# Patient Record
Sex: FEMALE | Race: WHITE | NOT HISPANIC OR LATINO | URBAN - METROPOLITAN AREA
[De-identification: names, ages, dates, MRNs, and addresses within clinical notes are randomized per-mention and may not be internally consistent; named-entity substitution may affect disease eponyms.]

---

## 2017-07-10 LAB — HPV MRNA E6/E7: NOT DETECTED

## 2017-12-18 ENCOUNTER — OFFICE VISIT (OUTPATIENT)
Dept: URGENT CARE | Facility: CLINIC | Age: 52
End: 2017-12-18
Payer: COMMERCIAL

## 2017-12-18 VITALS
SYSTOLIC BLOOD PRESSURE: 112 MMHG | BODY MASS INDEX: 27.29 KG/M2 | DIASTOLIC BLOOD PRESSURE: 68 MMHG | HEIGHT: 63 IN | TEMPERATURE: 99 F | OXYGEN SATURATION: 98 % | RESPIRATION RATE: 18 BRPM | WEIGHT: 154 LBS | HEART RATE: 76 BPM

## 2017-12-18 DIAGNOSIS — R42 VERTIGO: ICD-10-CM

## 2017-12-18 DIAGNOSIS — J32.9 SINUSITIS, UNSPECIFIED CHRONICITY, UNSPECIFIED LOCATION: Primary | ICD-10-CM

## 2017-12-18 PROCEDURE — 96372 THER/PROPH/DIAG INJ SC/IM: CPT | Mod: S$GLB,,, | Performed by: FAMILY MEDICINE

## 2017-12-18 PROCEDURE — 99203 OFFICE O/P NEW LOW 30 MIN: CPT | Mod: 25,S$GLB,, | Performed by: FAMILY MEDICINE

## 2017-12-18 RX ORDER — AZITHROMYCIN 250 MG/1
TABLET, FILM COATED ORAL
Qty: 6 TABLET | Refills: 0 | Status: SHIPPED | OUTPATIENT
Start: 2017-12-18 | End: 2018-11-18

## 2017-12-18 RX ORDER — FLUTICASONE PROPIONATE 50 MCG
2 SPRAY, SUSPENSION (ML) NASAL DAILY
Qty: 1 BOTTLE | Refills: 0 | Status: SHIPPED | OUTPATIENT
Start: 2017-12-18 | End: 2018-11-18

## 2017-12-18 RX ORDER — MECLIZINE HYDROCHLORIDE 25 MG/1
25 TABLET ORAL 3 TIMES DAILY PRN
Qty: 20 TABLET | Refills: 0 | Status: SHIPPED | OUTPATIENT
Start: 2017-12-18 | End: 2018-11-18

## 2017-12-18 RX ORDER — BETAMETHASONE SODIUM PHOSPHATE AND BETAMETHASONE ACETATE 3; 3 MG/ML; MG/ML
6 INJECTION, SUSPENSION INTRA-ARTICULAR; INTRALESIONAL; INTRAMUSCULAR; SOFT TISSUE
Status: COMPLETED | OUTPATIENT
Start: 2017-12-18 | End: 2017-12-18

## 2017-12-18 RX ADMIN — BETAMETHASONE SODIUM PHOSPHATE AND BETAMETHASONE ACETATE 6 MG: 3; 3 INJECTION, SUSPENSION INTRA-ARTICULAR; INTRALESIONAL; INTRAMUSCULAR; SOFT TISSUE at 11:12

## 2017-12-18 NOTE — PROGRESS NOTES
"Subjective:       Patient ID: Chelo Lindquist is a 52 y.o. female.    Vitals:  height is 5' 3" (1.6 m) and weight is 69.9 kg (154 lb). Her temperature is 98.6 °F (37 °C). Her blood pressure is 112/68 and her pulse is 76. Her respiration is 18 and oxygen saturation is 98%.     Chief Complaint: Sinus Problem    Sinus Problem   This is a new problem. The current episode started in the past 7 days. The problem is unchanged. There has been no fever. Her pain is at a severity of 2/10. Associated symptoms include congestion, ear pain and a sore throat. Pertinent negatives include no chills, coughing, headaches, hoarse voice or shortness of breath. Past treatments include nothing.     Review of Systems   Constitution: Positive for malaise/fatigue. Negative for chills and fever.   HENT: Positive for congestion, ear pain and sore throat. Negative for hoarse voice.    Eyes: Negative for discharge and redness.   Cardiovascular: Negative for chest pain, dyspnea on exertion and leg swelling.   Respiratory: Negative for cough, shortness of breath, sputum production and wheezing.    Musculoskeletal: Negative for myalgias.   Gastrointestinal: Negative for abdominal pain and nausea.   Neurological: Negative for headaches.       Objective:      Physical Exam   Constitutional: She is oriented to person, place, and time. She appears well-developed and well-nourished.   HENT:   Head: Normocephalic and atraumatic. Head is without abrasion, without contusion and without laceration.   Right Ear: External ear normal.   Left Ear: External ear normal.   Nose: Right sinus exhibits maxillary sinus tenderness and frontal sinus tenderness. Left sinus exhibits maxillary sinus tenderness and frontal sinus tenderness.   Mouth/Throat: Posterior oropharyngeal edema present. No oropharyngeal exudate or posterior oropharyngeal erythema.   Eyes: Conjunctivae and lids are normal. Pupils are equal, round, and reactive to light. Right eye exhibits no " nystagmus. Left eye exhibits no nystagmus.   Neck: Trachea normal, full passive range of motion without pain and phonation normal. Neck supple.   Cardiovascular: Normal rate, regular rhythm and normal heart sounds.    Pulmonary/Chest: Effort normal and breath sounds normal. No stridor. No respiratory distress.   Musculoskeletal: Normal range of motion.   Lymphadenopathy:        Head (right side): No submental and no submandibular adenopathy present.        Head (left side): No submental and no submandibular adenopathy present.     She has cervical adenopathy.   Neurological: She is alert and oriented to person, place, and time. She has normal reflexes. She is not disoriented. No cranial nerve deficit. She displays a negative Romberg sign. Coordination normal.   Good nose to finger and fine hand coordination.    Skin: Skin is warm, dry and intact. Capillary refill takes less than 2 seconds. No abrasion, no bruising, no burn, no ecchymosis, no laceration, no lesion and no rash noted. No erythema.   Psychiatric: She has a normal mood and affect. Her speech is normal and behavior is normal. Judgment and thought content normal. Cognition and memory are normal.   Nursing note and vitals reviewed.      Assessment:       1. Sinusitis, unspecified chronicity, unspecified location    2. Vertigo        Plan:         Sinusitis, unspecified chronicity, unspecified location  -     betamethasone acetate-betamethasone sodium phosphate injection 6 mg; Inject 1 mL (6 mg total) into the muscle one time.  -     azithromycin (ZITHROMAX Z-JONATHAN) 250 MG tablet; Take 2 tablets (500 mg) on  Day 1,  followed by 1 tablet (250 mg) once daily on Days 2 through 5.  Dispense: 6 tablet; Refill: 0  -     fluticasone (FLONASE) 50 mcg/actuation nasal spray; 2 sprays by Each Nare route once daily.  Dispense: 1 Bottle; Refill: 0    Vertigo  -     meclizine (ANTIVERT) 25 mg tablet; Take 1 tablet (25 mg total) by mouth 3 (three) times daily as needed for  Dizziness.  Dispense: 20 tablet; Refill: 0      Follow Up Comments   Make sure that you follow up with your primary care doctor in the next 2-5 days if needed .  Return to the Urgent Care if signs or symptoms change and certainly if you have worsening symptoms go to the nearest emergency department for further evaluation.

## 2017-12-18 NOTE — PATIENT INSTRUCTIONS
Inner Ear Problems: Causes of Dizziness (Vertigo)       Benign positional vertigo (BPV)  This is the most common cause of vertigo. BPV is also called benign positional paroxysmal vertigo (BPPV). It happens when crystals in the ear canals shift into the wrong place. Vertigo usually occurs when you move your head in a certain way. This can happen when turning in bed, bending, or looking up. Because BPV comes on quickly, you should think about if you are safe to drive or do other tasks that need your full attention.  BPV:  · Causes vertigo that last for seconds. Vertigo can occur several times a day, depending on body position.  · Doesnt cause hearing loss  · Often goes away on its own. But it but may go away sooner with treatment.  Infection or inflammation  Sometimes the semicircular canals swell and send incorrect balance signals. This problem may be caused by a viral infection. Depending on the cause, your hearing can be affected (labyrinthitis). Or your hearing can remain normal (neuronitis).  Infection or inflammation:  · Causes vertigo that lasts for hours or days. The first episode is usually the worst.  · Can cause hearing loss  · Often goes away on its own. But it may go away sooner with treatment.  You may need vestibular rehabilitation if you have balance problems that don't go away.  Menieres disease  This condition is uncommon. It happens when there is too much fluid in the ear canals. This causes increased pressure and swelling. It affects balance and hearing signals.  Menieres disease may:  · Cause vertigo that last for hours  · Cause hearing problems that come and go. The problems are usually in one ear and get worse over time.  · Cause buzzing or ringing in the ears (tinnitus)  · Cause a feeling of fullness or pressure in the ear  · Cause any of these symptoms: vertigo, hearing loss, tinnitus, or ear fullness to last a lifetime  Date Last Reviewed: 11/1/2016  © 1114-8466 [employer],  [employer]. [address] [place] [id number]. All rights reserved. This information is not intended as a substitute for professional medical care. Always follow your healthcare professional's instructions.        Sinusitis (Antibiotic Treatment)    The sinuses are air-filled spaces within the bones of the face. They connect to the inside of the nose. Sinusitis is an inflammation of the tissue lining the sinus cavity. Sinus inflammation can occur during a cold. It can also be due to allergies to pollens and other particles in the air. Sinusitis can cause symptoms of sinus congestion and fullness. A sinus infection causes fever, headache and facial pain. There is often green or yellow drainage from the nose or into the back of the throat (post-nasal drip). You have been given antibiotics to treat this condition.  Home care:  · Take the full course of antibiotics as instructed. Do not stop taking them, even if you feel better.  · Drink plenty of water, hot tea, and other liquids. This may help thin mucus. It also may promote sinus drainage.  · Heat may help soothe painful areas of the face. Use a towel soaked in hot water. Or,  the shower and direct the hot spray onto your face. Using a vaporizer along with a menthol rub at night may also help.   · An expectorant containing guaifenesin may help thin the mucus and promote drainage from the sinuses.  · Over-the-counter decongestants may be used unless a similar medicine was prescribed. Nasal sprays work the fastest. Use one that contains phenylephrine or oxymetazoline. First blow the nose gently. Then use the spray. Do not use these medicines more often than directed on the label or symptoms may get worse. You may also use tablets containing pseudoephedrine. Avoid products that combine ingredients, because side effects may be increased. Read labels. You can also ask the pharmacist for help. (NOTE: Persons with high blood pressure should not use decongestants.  They can raise blood pressure.)  · Over-the-counter antihistamines may help if allergies contributed to your sinusitis.    · Do not use nasal rinses or irrigation during an acute sinus infection, unless told to by your health care provider. Rinsing may spread the infection to other sinuses.  · Use acetaminophen or ibuprofen to control pain, unless another pain medicine was prescribed. (If you have chronic liver or kidney disease or ever had a stomach ulcer, talk with your doctor before using these medicines. Aspirin should never be used in anyone under 18 years of age who is ill with a fever. It may cause severe liver damage.)  · Don't smoke. This can worsen symptoms.  Follow-up care  Follow up with your healthcare provider or our staff if you are not improving within the next week.  When to seek medical advice  Call your healthcare provider if any of these occur:  · Facial pain or headache becoming more severe  · Stiff neck  · Unusual drowsiness or confusion  · Swelling of the forehead or eyelids  · Vision problems, including blurred or double vision  · Fever of 100.4ºF (38ºC) or higher, or as directed by your healthcare provider  · Seizure  · Breathing problems  · Symptoms not resolving within 10 days  Date Last Reviewed: 4/13/2015  © 1285-7255 [employer]. [address] [place] [address], [place] [address]. All rights reserved. This information is not intended as a substitute for professional medical care. Always follow your healthcare professional's instructions.                                                                    Sinusitis   If your condition worsens or fails to improve we recommend that you receive another evaluation at the ER immediately or contact your PCP to discuss your concerns or return here. You must understand that you've received an urgent care treatment only and that you may be released before all your medical problems are known or treated. You the patient will arrange for  "followup care as instructed.   If we discussed that I think your illness is viral it will not respond to antibiotics and it will last 10-14 days. However, if over the next few days the symptoms worsen start the antibiotics I have given you.   If we discussed that you require antibiotics start them now and take them to completion.   If you are female and on BCP and do take the antibiotics, use additional methods to prevent pregnancy while on the antibiotics and for one cycle after.   Flonase (fluticasone) is a nasal spray which is available over the counter and may help with your symptoms   Zyrtec D, Claritin D or allegra D can also help with symptoms of congestion and drainage.   If you have hypertension avoid using the "D" which is the decongestant   If you just have drainage you can take plain zyrtec, claritin or allegra   If you just have a congested feeling you can take pseudoephedrine (unless you have high blood pressure) which you have to sign for behind the counter. Do not buy the phenylephrine which is on the shelf as it is not effective   Rest and fluids are also important.   Tylenol or ibuprofen can also be used as directed for pain unless you have an allergy to them or medical condition such as stomach ulcers, kidney or liver disease or blood thinners etc for which you should not be taking these type of medications.   If you are flying in the next few days Afrin nose drops for the airplane flight upon take off and landing may help. Other than at those times refrain from using afrin.   If you were prescribed a narcotic do not drive or operate heavy machinery while taking these medications.       "

## 2018-04-10 ENCOUNTER — OFFICE VISIT (OUTPATIENT)
Dept: URGENT CARE | Facility: CLINIC | Age: 53
End: 2018-04-10
Payer: COMMERCIAL

## 2018-04-10 VITALS
TEMPERATURE: 99 F | WEIGHT: 154 LBS | HEIGHT: 63 IN | OXYGEN SATURATION: 97 % | RESPIRATION RATE: 18 BRPM | BODY MASS INDEX: 27.29 KG/M2 | SYSTOLIC BLOOD PRESSURE: 107 MMHG | HEART RATE: 94 BPM | DIASTOLIC BLOOD PRESSURE: 70 MMHG

## 2018-04-10 DIAGNOSIS — J32.9 SINUSITIS, UNSPECIFIED CHRONICITY, UNSPECIFIED LOCATION: Primary | ICD-10-CM

## 2018-04-10 PROCEDURE — 99214 OFFICE O/P EST MOD 30 MIN: CPT | Mod: S$GLB,,, | Performed by: PHYSICIAN ASSISTANT

## 2018-04-10 PROCEDURE — 96372 THER/PROPH/DIAG INJ SC/IM: CPT | Mod: S$GLB,,, | Performed by: FAMILY MEDICINE

## 2018-04-10 RX ORDER — AMOXICILLIN AND CLAVULANATE POTASSIUM 875; 125 MG/1; MG/1
1 TABLET, FILM COATED ORAL 2 TIMES DAILY
Qty: 14 TABLET | Refills: 0 | Status: SHIPPED | OUTPATIENT
Start: 2018-04-10 | End: 2018-04-17

## 2018-04-10 RX ORDER — BETAMETHASONE SODIUM PHOSPHATE AND BETAMETHASONE ACETATE 3; 3 MG/ML; MG/ML
6 INJECTION, SUSPENSION INTRA-ARTICULAR; INTRALESIONAL; INTRAMUSCULAR; SOFT TISSUE
Status: COMPLETED | OUTPATIENT
Start: 2018-04-10 | End: 2018-04-10

## 2018-04-10 RX ADMIN — BETAMETHASONE SODIUM PHOSPHATE AND BETAMETHASONE ACETATE 6 MG: 3; 3 INJECTION, SUSPENSION INTRA-ARTICULAR; INTRALESIONAL; INTRAMUSCULAR; SOFT TISSUE at 09:04

## 2018-04-10 NOTE — PATIENT INSTRUCTIONS
Please take antibiotic to completion.  -Below are suggestions for symptomatic relief:   -Tylenol every 4 hours OR ibuprofen every 6 hours as needed for pain/fever.   -Salt water gargles to soothe throat pain.   -Chloroseptic spray also helps to numb throat pain.   -Nasal saline spray reduces inflammation and dryness.   -Warm face compresses to help with facial sinus pain/pressure.   -Vicks vapor rub at night.   -Flonase OTC or Nasacort OTC for nasal congestion.   -Simple foods like chicken noodle soup.   -Delsym helps with coughing at night   -Zyrtec/Claritin during the day & Benadryl at night may help with allergies.     If you DO NOT have Hypertension or any history of palpitations, it is ok to take over the counter Sudafed or Mucinex D or Allegra-D or Claritin-D or Zyrtec-D.  If you do take one of the above, it is ok to combine that with plain over the counter Mucinex or Allegra or Claritin or Zyrtec. If, for example, you are taking Zyrtec -D, you can combine that with Mucinex, but not Mucinex-D.  If you are taking Mucinex-D, you can combine that with plain Allegra or Claritin or Zyrtec.   If you DO have Hypertension or palpitations, it is safe to take Coricidin HBP for relief of sinus symptoms.    Please follow up with your primary care provider within 2-5 days if your signs and symptoms have not resolved or worsen.     If your condition worsens or fails to improve we recommend that you receive another evaluation at the emergency room immediately or contact your primary medical clinic to discuss your concerns.   You must understand that you have received an Urgent Care treatment only and that you may be released before all of your medical problems are known or treated. You, the patient, will arrange for follow up care as instructed.

## 2018-04-10 NOTE — PROGRESS NOTES
"Subjective:       Patient ID: Chelo Lindquist is a 52 y.o. female.    Vitals:  height is 5' 3" (1.6 m) and weight is 69.9 kg (154 lb). Her temperature is 98.5 °F (36.9 °C). Her blood pressure is 107/70 and her pulse is 94. Her respiration is 18 and oxygen saturation is 97%.     Chief Complaint: Cough    Cough   This is a new problem. The current episode started yesterday. The problem has been gradually worsening. The problem occurs hourly. The cough is productive of sputum. Associated symptoms include nasal congestion, postnasal drip, a sore throat and shortness of breath. Pertinent negatives include no chest pain, chills, ear pain, eye redness, fever, headaches, myalgias, rash or wheezing. Treatments tried: Allergy Medicine  The treatment provided no relief.     Review of Systems   Constitution: Positive for malaise/fatigue. Negative for chills and fever.   HENT: Positive for congestion, hoarse voice, postnasal drip and sore throat. Negative for ear pain.    Eyes: Negative for discharge and redness.   Cardiovascular: Negative for chest pain, dyspnea on exertion and leg swelling.   Respiratory: Positive for cough, shortness of breath and sputum production. Negative for wheezing.    Skin: Negative for color change and rash.   Musculoskeletal: Negative for myalgias.   Gastrointestinal: Negative for abdominal pain and nausea.   Neurological: Negative for headaches.       Objective:      Physical Exam   Constitutional: She is oriented to person, place, and time. Vital signs are normal. She appears well-developed and well-nourished. She does not appear ill. No distress.   HENT:   Head: Normocephalic and atraumatic.   Right Ear: Hearing, tympanic membrane, external ear and ear canal normal.   Left Ear: Hearing, tympanic membrane, external ear and ear canal normal.   Nose: Mucosal edema present. Right sinus exhibits maxillary sinus tenderness and frontal sinus tenderness. Left sinus exhibits maxillary sinus tenderness " and frontal sinus tenderness.   Mouth/Throat: Uvula is midline. Posterior oropharyngeal erythema present. No oropharyngeal exudate or posterior oropharyngeal edema.   Hoarse voice.   Eyes: Conjunctivae, EOM and lids are normal. Right eye exhibits no discharge. Left eye exhibits no discharge.   Neck: Normal range of motion. Neck supple.   Cardiovascular: Normal rate, regular rhythm and normal heart sounds.  Exam reveals no gallop and no friction rub.    No murmur heard.  Pulmonary/Chest: Effort normal and breath sounds normal. No respiratory distress. She has no decreased breath sounds. She has no wheezes. She has no rhonchi. She has no rales.   Musculoskeletal: Normal range of motion.   Lymphadenopathy:        Head (right side): No submandibular and no tonsillar adenopathy present.        Head (left side): No submandibular and no tonsillar adenopathy present.   Neurological: She is alert and oriented to person, place, and time.   Skin: Skin is warm and dry. No rash noted. She is not diaphoretic. No erythema. No pallor.   Psychiatric: She has a normal mood and affect. Her behavior is normal.   Nursing note and vitals reviewed.      Assessment:       1. Sinusitis, unspecified chronicity, unspecified location        Plan:         Sinusitis, unspecified chronicity, unspecified location  -     betamethasone acetate-betamethasone sodium phosphate injection 6 mg; Inject 1 mL (6 mg total) into the muscle one time.  -     amoxicillin-clavulanate 875-125mg (AUGMENTIN) 875-125 mg per tablet; Take 1 tablet by mouth 2 (two) times daily.  Dispense: 14 tablet; Refill: 0      Patient Instructions   Please take antibiotic to completion.  -Below are suggestions for symptomatic relief:   -Tylenol every 4 hours OR ibuprofen every 6 hours as needed for pain/fever.   -Salt water gargles to soothe throat pain.   -Chloroseptic spray also helps to numb throat pain.   -Nasal saline spray reduces inflammation and dryness.   -Warm face  compresses to help with facial sinus pain/pressure.   -Vicks vapor rub at night.   -Flonase OTC or Nasacort OTC for nasal congestion.   -Simple foods like chicken noodle soup.   -Delsym helps with coughing at night   -Zyrtec/Claritin during the day & Benadryl at night may help with allergies.     If you DO NOT have Hypertension or any history of palpitations, it is ok to take over the counter Sudafed or Mucinex D or Allegra-D or Claritin-D or Zyrtec-D.  If you do take one of the above, it is ok to combine that with plain over the counter Mucinex or Allegra or Claritin or Zyrtec. If, for example, you are taking Zyrtec -D, you can combine that with Mucinex, but not Mucinex-D.  If you are taking Mucinex-D, you can combine that with plain Allegra or Claritin or Zyrtec.   If you DO have Hypertension or palpitations, it is safe to take Coricidin HBP for relief of sinus symptoms.    Please follow up with your primary care provider within 2-5 days if your signs and symptoms have not resolved or worsen.     If your condition worsens or fails to improve we recommend that you receive another evaluation at the emergency room immediately or contact your primary medical clinic to discuss your concerns.   You must understand that you have received an Urgent Care treatment only and that you may be released before all of your medical problems are known or treated. You, the patient, will arrange for follow up care as instructed.

## 2018-04-13 ENCOUNTER — TELEPHONE (OUTPATIENT)
Dept: URGENT CARE | Facility: CLINIC | Age: 53
End: 2018-04-13

## 2018-11-18 ENCOUNTER — OFFICE VISIT (OUTPATIENT)
Dept: URGENT CARE | Facility: CLINIC | Age: 53
End: 2018-11-18
Payer: COMMERCIAL

## 2018-11-18 VITALS
WEIGHT: 152 LBS | HEART RATE: 92 BPM | BODY MASS INDEX: 26.93 KG/M2 | RESPIRATION RATE: 16 BRPM | OXYGEN SATURATION: 99 % | TEMPERATURE: 99 F | SYSTOLIC BLOOD PRESSURE: 108 MMHG | DIASTOLIC BLOOD PRESSURE: 66 MMHG | HEIGHT: 63 IN

## 2018-11-18 DIAGNOSIS — J30.1 SEASONAL ALLERGIC RHINITIS DUE TO POLLEN: Primary | ICD-10-CM

## 2018-11-18 DIAGNOSIS — J06.9 URI, ACUTE: ICD-10-CM

## 2018-11-18 PROCEDURE — 96372 THER/PROPH/DIAG INJ SC/IM: CPT | Mod: S$GLB,,, | Performed by: FAMILY MEDICINE

## 2018-11-18 PROCEDURE — 3008F BODY MASS INDEX DOCD: CPT | Mod: CPTII,S$GLB,, | Performed by: FAMILY MEDICINE

## 2018-11-18 PROCEDURE — 99214 OFFICE O/P EST MOD 30 MIN: CPT | Mod: 25,S$GLB,, | Performed by: FAMILY MEDICINE

## 2018-11-18 RX ORDER — BENZONATATE 100 MG/1
200 CAPSULE ORAL 3 TIMES DAILY PRN
Qty: 40 CAPSULE | Refills: 0 | Status: SHIPPED | OUTPATIENT
Start: 2018-11-18 | End: 2020-11-12

## 2018-11-18 RX ORDER — BETAMETHASONE SODIUM PHOSPHATE AND BETAMETHASONE ACETATE 3; 3 MG/ML; MG/ML
6 INJECTION, SUSPENSION INTRA-ARTICULAR; INTRALESIONAL; INTRAMUSCULAR; SOFT TISSUE
Status: COMPLETED | OUTPATIENT
Start: 2018-11-18 | End: 2018-11-18

## 2018-11-18 RX ADMIN — BETAMETHASONE SODIUM PHOSPHATE AND BETAMETHASONE ACETATE 6 MG: 3; 3 INJECTION, SUSPENSION INTRA-ARTICULAR; INTRALESIONAL; INTRAMUSCULAR; SOFT TISSUE at 09:11

## 2018-11-18 NOTE — PROGRESS NOTES
"Subjective:       Patient ID: Chelo Lindquist is a 53 y.o. female.    Vitals:  height is 5' 3" (1.6 m) and weight is 68.9 kg (152 lb). Her oral temperature is 98.5 °F (36.9 °C). Her blood pressure is 108/66 and her pulse is 92. Her respiration is 16 and oxygen saturation is 99%.     Chief Complaint: Sinus Problem    C/o sore throat, post nasal drip and non productive coough x 3-4 days      Sinus Problem   This is a new problem. Episode onset: 4 days. The problem has been gradually worsening since onset. There has been no fever. Her pain is at a severity of 0/10. The pain is mild. Associated symptoms include congestion, coughing, sinus pressure, sneezing and a sore throat. Pertinent negatives include no chills, diaphoresis, ear pain or shortness of breath. Past treatments include acetaminophen. The treatment provided mild relief.       Constitution: Positive for generalized weakness. Negative for chills, sweating, fatigue and fever.   HENT: Positive for congestion, sinus pain, sinus pressure and sore throat. Negative for ear pain and voice change.    Neck: Negative for painful lymph nodes.   Eyes: Negative for eye redness.   Respiratory: Positive for cough and sputum production. Negative for chest tightness, bloody sputum, COPD, shortness of breath, stridor, wheezing and asthma.    Gastrointestinal: Negative for nausea and vomiting.   Musculoskeletal: Negative for muscle ache.   Skin: Negative for rash.   Allergic/Immunologic: Positive for sneezing. Negative for seasonal allergies and asthma.   Hematologic/Lymphatic: Negative for swollen lymph nodes.       Objective:      Physical Exam   Constitutional: She is oriented to person, place, and time. She appears well-developed and well-nourished. She is cooperative.  Non-toxic appearance. She does not appear ill. No distress.   HENT:   Head: Normocephalic and atraumatic.   Right Ear: Hearing, tympanic membrane, external ear and ear canal normal.   Left Ear: Hearing, " tympanic membrane, external ear and ear canal normal.   Nose: Nose normal. No mucosal edema, rhinorrhea or nasal deformity. No epistaxis. Right sinus exhibits no maxillary sinus tenderness and no frontal sinus tenderness. Left sinus exhibits no maxillary sinus tenderness and no frontal sinus tenderness.   Mouth/Throat: Uvula is midline, oropharynx is clear and moist and mucous membranes are normal. No trismus in the jaw. Normal dentition. No uvula swelling. No oropharyngeal exudate or posterior oropharyngeal erythema.   Eyes: Conjunctivae and lids are normal. Right eye exhibits no discharge. Left eye exhibits no discharge. No scleral icterus.   Sclera clear bilat   Neck: Trachea normal, normal range of motion, full passive range of motion without pain and phonation normal. Neck supple. No JVD present. No tracheal deviation present. No thyromegaly present.   Cardiovascular: Normal rate, regular rhythm, normal heart sounds, intact distal pulses and normal pulses. Exam reveals no friction rub.   No murmur heard.  Pulmonary/Chest: Effort normal and breath sounds normal. No stridor. No respiratory distress. She has no wheezes.   Abdominal: Soft. Normal appearance and bowel sounds are normal. She exhibits no distension, no pulsatile midline mass and no mass. There is no tenderness.   Genitourinary: Rectal exam shows guaiac negative stool.   Musculoskeletal: Normal range of motion. She exhibits no edema or deformity.   Lymphadenopathy:     She has no cervical adenopathy.   Neurological: She is alert and oriented to person, place, and time. She exhibits normal muscle tone. Coordination normal.   Skin: Skin is warm, dry and intact. She is not diaphoretic. No pallor.   Psychiatric: She has a normal mood and affect. Her speech is normal and behavior is normal. Judgment and thought content normal. Cognition and memory are normal.   Nursing note and vitals reviewed.      Assessment:       1. Seasonal allergic rhinitis due to  pollen    2. URI, acute        Plan:         Seasonal allergic rhinitis due to pollen  -     betamethasone acetate-betamethasone sodium phosphate injection 6 mg  -     benzonatate (TESSALON PERLES) 100 MG capsule; Take 2 capsules (200 mg total) by mouth 3 (three) times daily as needed for Cough.  Dispense: 40 capsule; Refill: 0    URI, acute  -     benzonatate (TESSALON PERLES) 100 MG capsule; Take 2 capsules (200 mg total) by mouth 3 (three) times daily as needed for Cough.  Dispense: 40 capsule; Refill: 0        Claritin one daily    Mucinex DM one bid

## 2020-10-29 ENCOUNTER — PATIENT OUTREACH (OUTPATIENT)
Dept: ADMINISTRATIVE | Facility: HOSPITAL | Age: 55
End: 2020-10-29

## 2020-10-29 NOTE — PROGRESS NOTES
Pre-visit chart review completed.  Unable to log in to LINKS to update immuniztions    Patient due for the following    Hepatitis C Screening     Lipid Panel     HIV Screening     TETANUS VACCINE     Shingles Vaccine (1 of 2)    Colorectal Cancer Screening     Influenza Vaccine (1)      Found pap w/ hpv results and mammogram in care everywhere.  Scanned to media.  HM updated.    E-faxed [facility] endoscopy center for most recent c-scope results.    Provider to discuss HIV and Hep C screening.    No out reach needed.

## 2020-10-29 NOTE — LETTER
AUTHORIZATION FOR RELEASE OF   CONFIDENTIAL INFORMATION    TO: Endoscopy Center,    We are seeing Chelo Lindquist, date of birth 1965, in the clinic at [facility]. Kierra Fernandez MD is the patient's PCP. Chelo Lindquist has an outstanding lab/procedure at the time we reviewed her chart. In order to help keep her health information updated, she has authorized us to request the following medical record(s):        (  )  MAMMOGRAM                                      ( X )  COLONOSCOPY      (  )  PAP SMEAR                                          (  )  OUTSIDE LAB RESULTS     (  )  DEXA SCAN                                          (  )  EYE EXAM            (  )  FOOT EXAM                                          (  )  ENTIRE RECORD     (  )  OUTSIDE IMMUNIZATIONS                 (  )  _______________         Please fax records to [facility], Nicole Giambrone, MD, [phone]     If you have any questions, please contact Teri at [phone].           Patient Name: Chelo Lindquist  : 1965  Patient Phone #: [phone]

## 2020-11-11 NOTE — PROGRESS NOTES
"[facility] Note    Chief Complaint      Chief Complaint   Patient presents with    Establish Care       History of Present Illness      Chelo Lindquits is a 55 y.o. WF with h/o Breast fibroadenoma presents to re-est care with me and to fu well visit.     Left breast Pain - Pt fu by [facility].  Recent MGM as below.  Pt prev told she had Breast Ca by prev OB/GYN - Dr. Pope and went to [facility] and had multiple biopsies and had no evidence of malignancy. She plans to fu with [facility].    Palpitations - She saw Cards, Dr. Freeman, in past.  She had a Holter and echo and these were normal.  It happens every other day.  They are not more frequent. They last 5 -10 minutes? And self resolve. "It makes me anxious".  Rec avoid oral decongestants. She had CP and Left arm pain last wk.  It lasted 15-20 minutes.  Then she has neck and shoulder soreness the rest of the day. Onset - sitting on sofa.  She did do some heavy lifting that AM and the day before.  She does zaynab work with her . She prefers not to go back to Dr. Freeman as sister-in-law  while under his care.     Dizziness - Sometimes gets a spinning sensation. Lightheadedness and sometimes imbalance.  It comes and goes. It happens every other day.  It can last hours. No assoc with change in position. Sometimes be associated with her palpitations.     HCM - Flu - 10/2020 at [facility];  Tdap - ? At [facility];  PCV 13 - none;  PVX 23 - none;  Shingrix - none - defers; 3D MGM - 21 - repeat 1 yr; h/o Breast Bx - 19;  DEXA - none;  PAP - 7/10/17 and HPV neg- due; Hep C Screen - none - will order;  HIV Screen - utd?; C-scope - none - will refer; Prev PCP - me at [facility] > 3 yrs ago; Prev Ob/GYN - Dr. Pope - would like a new OB; Optometrist - Dr. Finkelstein    Other Result Information   Interface, Radiology Results In - 2020  1:22 PM CDT  CLINICAL INDICATION:  Patient is a 55 year old female and is seen for screening.    MAMMO DIGITAL " SCREENING BILATERAL W LORAINE  Digital Mammogram evaluated with Computer Aided Detection (CAD).    COMPARISON:  The present examination has been compared to prior imaging studies performed at  an outside location on 2016, and at [facility]  on 2018 and 2019.    FINDINGS:  The breasts are heterogeneously dense, which may obscure small masses.    1:  There are benign appearing calcifications with grouped distribution and  associated post biopsy clip in the left breast upper outer quadrant at 1 to 2  o'clock located 7 centimeters from the nipple.  On 18, stereotactic biopsy  showed fibroadenomatoid changes with microcalcifications.    2:  There is an oval mass in the left breast outer hemisphere at 3 o'clock  located 6 centimeters from the nipple.  An internal clip was noted.  On 18,  ultrasound-guided core biopsy showed evidence of a fibroadenoma.    3:  There is an obscured mass in the left breast lower outer quadrant at 4  o'clock located 3 centimeters from the nipple.  An associated clip was noted.  On 18, ultrasound-guided core biopsy showed evidence of a fibroadenoma.    In the right breast, no dominant mass, distortion, or suspicious calcifications  are identified.    Tomosynthesis performed in CC and MLO projections.    IMPRESSION:  There is no mammographic evidence of malignancy.    Routine follow-up mammogram in 1 year is recommended.    BI-RADS Category 2:  Benign Finding(s)         Past Medical History:  Past Medical History:   Diagnosis Date    Vertigo        Past Surgical History:   has a past surgical history that includes Breast biopsy; ear cyst; Breast cyst excision; Laparoscopy; Ulnar nerve repair; Carpal tunnel release (Right);  section; Dilation and curettage of uterus; and Breast biopsy.    Family History:  family history includes Bone cancer in her maternal uncle; Breast cancer in her maternal cousin; Diabetes in her father;  Emphysema in her father; Hypertension in her father; Lung cancer in her maternal uncle and paternal aunt; No Known Problems in her brother and sister; Other in her son and son; Squamous cell carcinoma in her son; Testicular cancer in her paternal uncle; Thyroid disease in her mother.     Social History:  Social History     Tobacco Use    Smoking status: Never Smoker    Smokeless tobacco: Never Used   Substance Use Topics    Alcohol use: Yes     Frequency: 2-4 times a month     Drinks per session: 1 or 2     Comment: 1 drink Q 2 wks    Drug use: Never       Review of Systems   Constitutional: Negative for chills, diaphoresis and fever.   HENT: Negative for hearing loss and tinnitus.    Eyes: Positive for blurred vision. Negative for double vision.   Respiratory: Negative for cough, shortness of breath and wheezing.    Cardiovascular: Positive for chest pain and palpitations. Negative for leg swelling.   Gastrointestinal: Negative for abdominal pain, blood in stool, constipation, diarrhea, heartburn, melena, nausea and vomiting.   Genitourinary: Negative for dysuria and frequency.   Musculoskeletal: Negative for joint pain, myalgias and neck pain.        Still tightness in neck/upper back - rec heating pad for 20 min intervals. Can take Aleve prn with food and monitor for any GI effects.    Skin: Negative for itching and rash.   Neurological: Positive for dizziness. Negative for headaches.   Endo/Heme/Allergies: Bruises/bleeds easily.   Psychiatric/Behavioral: Negative for depression. The patient is nervous/anxious.         Regarding son's medical issues.         Medications:  Outpatient Encounter Medications as of 11/12/2020   Medication Sig Dispense Refill    LORYNA, 28, 3-0.02 mg per tablet Take 1 tablet by mouth once daily.      [DISCONTINUED] benzonatate (TESSALON PERLES) 100 MG capsule Take 2 capsules (200 mg total) by mouth 3 (three) times daily as needed for Cough. 40 capsule 0     No  "facility-administered encounter medications on file as of 11/12/2020.        Current Outpatient Medications:     LORYNA, 28, 3-0.02 mg per tablet, Take 1 tablet by mouth once daily., Disp: , Rfl:     Allergies:  Review of patient's allergies indicates:   Allergen Reactions    Morphine Hives       Health Maintenance:    There is no immunization history on file for this patient.   Health Maintenance   Topic Date Due    Hepatitis C Screening  1965    Lipid Panel  1965    TETANUS VACCINE  06/07/1984    Mammogram  06/25/2022    Pap Smear with HPV Cotest  07/10/2022      Objective:      Vital Signs  Temp: 98 °F (36.7 °C)  Pulse: 88  Resp: 19  SpO2: 99 %  BP: 110/64  BP Location: Right arm  Patient Position: Sitting  Pain Score: 0-No pain  Height and Weight  Height: 5' 3" (160 cm)  Weight: 63.6 kg (140 lb 3.4 oz)  BSA (Calculated - sq m): 1.68 sq meters  BMI (Calculated): 24.8  Weight in (lb) to have BMI = 25: 140.8]    Laboratory:    Physical Exam  Vitals signs reviewed.   Constitutional:       General: She is not in acute distress.     Appearance: Normal appearance. She is not ill-appearing, toxic-appearing or diaphoretic.   HENT:      Head: Normocephalic and atraumatic.      Right Ear: Tympanic membrane normal.      Left Ear: Tympanic membrane normal.   Eyes:      Extraocular Movements: Extraocular movements intact.      Conjunctiva/sclera: Conjunctivae normal.      Pupils: Pupils are equal, round, and reactive to light.   Neck:      Musculoskeletal: Normal range of motion. No neck rigidity.      Vascular: No carotid bruit.   Cardiovascular:      Rate and Rhythm: Normal rate and regular rhythm.      Pulses: Normal pulses.      Heart sounds: Normal heart sounds.   Pulmonary:      Effort: No respiratory distress.      Breath sounds: Normal breath sounds. No wheezing or rhonchi.   Abdominal:      General: Bowel sounds are normal. There is no distension.      Palpations: Abdomen is soft.      Tenderness: " There is no abdominal tenderness. There is no guarding or rebound.   Musculoskeletal: Normal range of motion.         General: No swelling or tenderness.   Lymphadenopathy:      Cervical: No cervical adenopathy.   Skin:     General: Skin is warm and dry.      Comments: Breast Exam - no palpable masses; TTP over Left breast over areola at 12 - 2 o'clock and to lateral breast at 4-6 o'clock. Slight nipple retraction bilaterally.     Neurological:      General: No focal deficit present.      Mental Status: She is alert and oriented to person, place, and time.   Psychiatric:         Mood and Affect: Mood normal.         Behavior: Behavior normal.             Assessment:       1. Normal physical exam, routine    2. Palpitations    3. Atypical chest pain    4. Colon cancer screening    5. Need for hepatitis C screening test    6. Screening for lipoid disorders    7. Encounter for Papanicolaou smear for cervical cancer screening        Chelo Lindquist is a 55 y.o.female with:    1. Normal physical exam, routine  - CBC Auto Differential; Future  - Comprehensive Metabolic Panel; Future  - T4, Free; Future  - TSH; Future  - Performed today.  Will check Basic labs.  RTC in 1 yr for fu or sooner if needed    2. Palpitations  - IN OFFICE EKG 12-LEAD (to [place])  - Ambulatory referral/consult to Cardiology; Future  - Check EKG - NSR.  Will obtain old records from [facility] and from [facility], Dr. Efrain Freeman.  Avoid oral decongestants.  Check TFT's.     3. Atypical chest pain  - IN OFFICE EKG 12-LEAD (to [place])  - Ambulatory referral/consult to Cardiology; Future  -Suspect may be MSK.  Check EKG. Rec heating pad to neck x 20 min.  Ok for Aleve with food prn.     4. Breast pain, left  -Pt will fu with [facility].  Breast exam today - no masses palpated or asymmetry. Slight nipple retraction bilaterally.     5. Colon cancer screening  - Ambulatory referral/consult to Gastroenterology; Future  -Refer to GI, Dr. Stokes    6. Need for hepatitis C  screening test  - Hepatitis C Antibody; Future    7. Screening for lipoid disorders  - Lipid Panel; Future    8. Encounter for Papanicolaou smear for cervical cancer screening  - Ambulatory referral/consult to Obstetrics / Gynecology; Future       Chronic conditions status updated as per HPI.  Other than changes above, cont current medications and maintain follow up with specialists.  Return to clinic in 12 months for well visit or sooner if needed.    Kierra Fernandez MD  [facility]

## 2020-11-12 ENCOUNTER — OFFICE VISIT (OUTPATIENT)
Dept: PRIMARY CARE CLINIC | Facility: CLINIC | Age: 55
End: 2020-11-12
Payer: COMMERCIAL

## 2020-11-12 ENCOUNTER — TELEPHONE (OUTPATIENT)
Dept: PRIMARY CARE CLINIC | Facility: CLINIC | Age: 55
End: 2020-11-12

## 2020-11-12 VITALS
HEIGHT: 63 IN | TEMPERATURE: 98 F | HEART RATE: 88 BPM | DIASTOLIC BLOOD PRESSURE: 64 MMHG | BODY MASS INDEX: 24.84 KG/M2 | WEIGHT: 140.19 LBS | RESPIRATION RATE: 19 BRPM | OXYGEN SATURATION: 99 % | SYSTOLIC BLOOD PRESSURE: 110 MMHG

## 2020-11-12 DIAGNOSIS — Z12.4 ENCOUNTER FOR PAPANICOLAOU SMEAR FOR CERVICAL CANCER SCREENING: ICD-10-CM

## 2020-11-12 DIAGNOSIS — R07.89 ATYPICAL CHEST PAIN: ICD-10-CM

## 2020-11-12 DIAGNOSIS — N64.4 BREAST PAIN, LEFT: ICD-10-CM

## 2020-11-12 DIAGNOSIS — Z12.11 COLON CANCER SCREENING: ICD-10-CM

## 2020-11-12 DIAGNOSIS — Z13.220 SCREENING FOR LIPOID DISORDERS: ICD-10-CM

## 2020-11-12 DIAGNOSIS — Z11.59 NEED FOR HEPATITIS C SCREENING TEST: ICD-10-CM

## 2020-11-12 DIAGNOSIS — R00.2 PALPITATIONS: ICD-10-CM

## 2020-11-12 DIAGNOSIS — Z00.00 NORMAL PHYSICAL EXAM, ROUTINE: Primary | ICD-10-CM

## 2020-11-12 PROBLEM — D24.2 FIBROADENOMA OF LEFT BREAST: Status: ACTIVE | Noted: 2020-11-12

## 2020-11-12 PROCEDURE — 3008F PR BODY MASS INDEX (BMI) DOCUMENTED: ICD-10-PCS | Mod: CPTII,S$GLB,, | Performed by: INTERNAL MEDICINE

## 2020-11-12 PROCEDURE — 99999 PR PBB SHADOW E&M-EST. PATIENT-LVL V: ICD-10-PCS | Mod: PBBFAC,,, | Performed by: INTERNAL MEDICINE

## 2020-11-12 PROCEDURE — 99386 PR PREVENTIVE VISIT,NEW,40-64: ICD-10-PCS | Mod: S$GLB,,, | Performed by: INTERNAL MEDICINE

## 2020-11-12 PROCEDURE — 93005 ELECTROCARDIOGRAM TRACING: CPT | Mod: S$GLB,,, | Performed by: INTERNAL MEDICINE

## 2020-11-12 PROCEDURE — 99999 PR PBB SHADOW E&M-EST. PATIENT-LVL V: CPT | Mod: PBBFAC,,, | Performed by: INTERNAL MEDICINE

## 2020-11-12 PROCEDURE — 1126F PR PAIN SEVERITY QUANTIFIED, NO PAIN PRESENT: ICD-10-PCS | Mod: S$GLB,,, | Performed by: INTERNAL MEDICINE

## 2020-11-12 PROCEDURE — 93005 EKG 12-LEAD: ICD-10-PCS | Mod: S$GLB,,, | Performed by: INTERNAL MEDICINE

## 2020-11-12 PROCEDURE — 1126F AMNT PAIN NOTED NONE PRSNT: CPT | Mod: S$GLB,,, | Performed by: INTERNAL MEDICINE

## 2020-11-12 PROCEDURE — 93010 ELECTROCARDIOGRAM REPORT: CPT | Mod: S$GLB,,, | Performed by: INTERNAL MEDICINE

## 2020-11-12 PROCEDURE — 99386 PREV VISIT NEW AGE 40-64: CPT | Mod: S$GLB,,, | Performed by: INTERNAL MEDICINE

## 2020-11-12 PROCEDURE — 93010 EKG 12-LEAD: ICD-10-PCS | Mod: S$GLB,,, | Performed by: INTERNAL MEDICINE

## 2020-11-12 PROCEDURE — 3008F BODY MASS INDEX DOCD: CPT | Mod: CPTII,S$GLB,, | Performed by: INTERNAL MEDICINE

## 2020-11-12 RX ORDER — DROSPIRENONE AND ETHINYL ESTRADIOL TABLETS 0.02-3(28)
1 KIT ORAL DAILY
COMMUNITY
Start: 2020-10-15

## 2020-11-12 NOTE — TELEPHONE ENCOUNTER
Pt was still in the building. I switched orders to [facility] and gave her a hard copy to [facility]

## 2020-11-12 NOTE — TELEPHONE ENCOUNTER
----- Message from Imelda Villafuerte sent at 11/12/2020  9:18 AM CST -----  Please send patient lab order to [facility] and notify patient when orders are sent.

## 2020-11-12 NOTE — TELEPHONE ENCOUNTER
----- Message from Kierra Fernandez MD sent at 11/12/2020  4:55 PM CST -----  Pt aware her EKG was normal per visit today.    Dr. CASTILLO

## 2020-11-14 LAB
ALBUMIN SERPL-MCNC: 3.8 G/DL (ref 3.6–5.1)
ALBUMIN/GLOB SERPL: 1.5 (CALC) (ref 1–2.5)
ALP SERPL-CCNC: 40 U/L (ref 37–153)
ALT SERPL-CCNC: 20 U/L (ref 6–29)
AST SERPL-CCNC: 16 U/L (ref 10–35)
BASOPHILS # BLD AUTO: 48 CELLS/UL (ref 0–200)
BASOPHILS NFR BLD AUTO: 1.1 %
BILIRUB SERPL-MCNC: 0.5 MG/DL (ref 0.2–1.2)
BUN SERPL-MCNC: 19 MG/DL (ref 7–25)
BUN/CREAT SERPL: NORMAL (CALC) (ref 6–22)
CALCIUM SERPL-MCNC: 9.1 MG/DL (ref 8.6–10.4)
CHLORIDE SERPL-SCNC: 105 MMOL/L (ref 98–110)
CHOLEST SERPL-MCNC: 145 MG/DL
CHOLEST/HDLC SERPL: 2.3 (CALC)
CO2 SERPL-SCNC: 26 MMOL/L (ref 20–32)
CREAT SERPL-MCNC: 0.8 MG/DL (ref 0.5–1.05)
EOSINOPHIL # BLD AUTO: 79 CELLS/UL (ref 15–500)
EOSINOPHIL NFR BLD AUTO: 1.8 %
ERYTHROCYTE [DISTWIDTH] IN BLOOD BY AUTOMATED COUNT: 12.8 % (ref 11–15)
GFRSERPLBLD MDRD-ARVRAT: 84 ML/MIN/1.73M2
GLOBULIN SER CALC-MCNC: 2.6 G/DL (CALC) (ref 1.9–3.7)
GLUCOSE SERPL-MCNC: 94 MG/DL (ref 65–99)
HCT VFR BLD AUTO: 38.8 % (ref 35–45)
HDLC SERPL-MCNC: 64 MG/DL
HGB BLD-MCNC: 12.6 G/DL (ref 11.7–15.5)
LDLC SERPL CALC-MCNC: 64 MG/DL (CALC)
LYMPHOCYTES # BLD AUTO: 1307 CELLS/UL (ref 850–3900)
LYMPHOCYTES NFR BLD AUTO: 29.7 %
MCH RBC QN AUTO: 27.8 PG (ref 27–33)
MCHC RBC AUTO-ENTMCNC: 32.5 G/DL (ref 32–36)
MCV RBC AUTO: 85.5 FL (ref 80–100)
MONOCYTES # BLD AUTO: 488 CELLS/UL (ref 200–950)
MONOCYTES NFR BLD AUTO: 11.1 %
NEUTROPHILS # BLD AUTO: 2477 CELLS/UL (ref 1500–7800)
NEUTROPHILS NFR BLD AUTO: 56.3 %
NONHDLC SERPL-MCNC: 81 MG/DL (CALC)
PLATELET # BLD AUTO: 286 THOUSAND/UL (ref 140–400)
PMV BLD REES-ECKER: 10.8 FL (ref 7.5–12.5)
POTASSIUM SERPL-SCNC: 4.5 MMOL/L (ref 3.5–5.3)
PROT SERPL-MCNC: 6.4 G/DL (ref 6.1–8.1)
RBC # BLD AUTO: 4.54 MILLION/UL (ref 3.8–5.1)
SODIUM SERPL-SCNC: 138 MMOL/L (ref 135–146)
T4 FREE SERPL-MCNC: 1 NG/DL (ref 0.8–1.8)
TRIGL SERPL-MCNC: 88 MG/DL
TSH SERPL-ACNC: 2.99 MIU/L
WBC # BLD AUTO: 4.4 THOUSAND/UL (ref 3.8–10.8)

## 2020-11-16 ENCOUNTER — TELEPHONE (OUTPATIENT)
Dept: PRIMARY CARE CLINIC | Facility: CLINIC | Age: 55
End: 2020-11-16

## 2020-11-16 LAB
HCV AB S/CO SERPL IA: 0.02
HCV AB SERPL QL IA: NORMAL

## 2020-11-16 NOTE — PROGRESS NOTES
Please inform pt - I reviewed your labs.  Your hepatitis C screen was negative.  Your Thyroid functions were normal.Your Cholesterol looked good.  Your kidney function and liver functions looked good.  You are not anemic. No further recommendations at this time.    Dr. CASTILLO

## 2020-11-16 NOTE — TELEPHONE ENCOUNTER
----- Message from Kierra Fernandez MD sent at 11/16/2020  1:00 PM CST -----  Please inform pt - I reviewed your labs.  Your hepatitis C screen was negative.  Your Thyroid functions were normal.Your Cholesterol looked good.  Your kidney function and liver functions looked good.  You are not anemic. No further recommendations at this time.    Dr. CASTILLO

## 2020-12-03 ENCOUNTER — OFFICE VISIT (OUTPATIENT)
Dept: CARDIOLOGY | Facility: CLINIC | Age: 55
End: 2020-12-03
Payer: COMMERCIAL

## 2020-12-03 VITALS
WEIGHT: 140.44 LBS | HEART RATE: 95 BPM | SYSTOLIC BLOOD PRESSURE: 116 MMHG | HEIGHT: 63 IN | DIASTOLIC BLOOD PRESSURE: 55 MMHG | BODY MASS INDEX: 24.88 KG/M2

## 2020-12-03 DIAGNOSIS — R00.2 PALPITATIONS: ICD-10-CM

## 2020-12-03 DIAGNOSIS — R07.89 ATYPICAL CHEST PAIN: ICD-10-CM

## 2020-12-03 PROCEDURE — 3008F PR BODY MASS INDEX (BMI) DOCUMENTED: ICD-10-PCS | Mod: CPTII,S$GLB,, | Performed by: INTERNAL MEDICINE

## 2020-12-03 PROCEDURE — 3008F BODY MASS INDEX DOCD: CPT | Mod: CPTII,S$GLB,, | Performed by: INTERNAL MEDICINE

## 2020-12-03 PROCEDURE — 99999 PR PBB SHADOW E&M-EST. PATIENT-LVL IV: CPT | Mod: PBBFAC,,, | Performed by: INTERNAL MEDICINE

## 2020-12-03 PROCEDURE — 1126F PR PAIN SEVERITY QUANTIFIED, NO PAIN PRESENT: ICD-10-PCS | Mod: S$GLB,,, | Performed by: INTERNAL MEDICINE

## 2020-12-03 PROCEDURE — 99999 PR PBB SHADOW E&M-EST. PATIENT-LVL IV: ICD-10-PCS | Mod: PBBFAC,,, | Performed by: INTERNAL MEDICINE

## 2020-12-03 PROCEDURE — 99204 PR OFFICE/OUTPT VISIT, NEW, LEVL IV, 45-59 MIN: ICD-10-PCS | Mod: S$GLB,,, | Performed by: INTERNAL MEDICINE

## 2020-12-03 PROCEDURE — 99204 OFFICE O/P NEW MOD 45 MIN: CPT | Mod: S$GLB,,, | Performed by: INTERNAL MEDICINE

## 2020-12-03 PROCEDURE — 1126F AMNT PAIN NOTED NONE PRSNT: CPT | Mod: S$GLB,,, | Performed by: INTERNAL MEDICINE

## 2020-12-03 NOTE — LETTER
December 3, 2020      Kierra Fernandez MD  [address] [place] [facility] [address]           [facility] [address]  [address]  [facility] [address]  Phone: [phone]          Patient: Chelo Lindquist   MR Number: [id number]   YOB: 1965   Date of Visit: 12/3/2020       Dear Dr. Kierra Fernandez:    Thank you for referring Chelo Lindquist to me for evaluation. Attached you will find relevant portions of my assessment and plan of care.    If you have questions, please do not hesitate to call me. I look forward to following Chelo Lindquist along with you.    Sincerely,    Peggy Avila MD    Enclosure  CC:  No Recipients    If you would like to receive this communication electronically, please contact [email or web address] or [phone] to request more information on [employer] Link access.    For providers and/or their staff who would like to refer a patient to [facility], please contact us through our one-stop-shop provider referral line, [facility], at [phone].    If you feel you have received this communication in error or would no longer like to receive these types of communications, please e-mail [email or web address]

## 2020-12-03 NOTE — PROGRESS NOTES
Subjective:   Patient ID:  Chelo Lindquist is a 55 y.o. female who presents for evaluation of Palpitations and Atypical chest pain      HPI:  She presents today for the evaluation of palpitations and lightheadedness.  She states that any time she goes on a cruise she has prolonged vertigo after disembarking.  This last occurred in 2019.  She does report occasional lightheadedness which is not positional.  She has not had any syncope.  She does suffer with chronic sinus issues.  She also reports feeling occasional heart palpitations.  They occur maybe once a week and last for a few minutes at a time.  She has not had any sustained symptoms.  She is quite busy at work.  She has no cardiac history.  She has to walk with her  on a daily basis this prior to the pandemic.  She recently had blood work done which was all within normal limits.  She drinks 1 cup of coffee in the morning.  She is not taking any decongestants.    ECG 2020:  Normal sinus rhythm 72 beats per minute.    Past Medical History:   Diagnosis Date    Vertigo        Past Surgical History:   Procedure Laterality Date    BREAST BIOPSY      punch biopsies    BREAST BIOPSY      stereotactic    BREAST CYST EXCISION      CARPAL TUNNEL RELEASE Right      SECTION      x 1    DILATION AND CURETTAGE OF UTERUS      s/p miscarriage    ear cyst      LAPAROSCOPY      ULNAR NERVE REPAIR         Social History     Socioeconomic History    Marital status:      Spouse name: Not on file    Number of children: Not on file    Years of education: Not on file    Highest education level: Not on file   Occupational History    Not on file   Social Needs    Financial resource strain: Not on file    Food insecurity     Worry: Not on file     Inability: Not on file    Transportation needs     Medical: Not on file     Non-medical: Not on file   Tobacco Use    Smoking status: Never Smoker    Smokeless tobacco: Never Used    Substance and Sexual Activity    Alcohol use: Yes     Frequency: 2-4 times a month     Drinks per session: 1 or 2     Comment: 1 drink Q 2 wks    Drug use: Never    Sexual activity: Yes     Partners: Male     Birth control/protection: OCP   Lifestyle    Physical activity     Days per week: Not on file     Minutes per session: Not on file    Stress: Not on file   Relationships    Social connections     Talks on phone: Not on file     Gets together: Not on file     Attends Latter day service: Not on file     Active member of club or organization: Not on file     Attends meetings of clubs or organizations: Not on file     Relationship status: Not on file   Other Topics Concern    Not on file   Social History Narrative    Not on file       Family History   Problem Relation Age of Onset    Thyroid disease Mother     Hypertension Father     Diabetes Father     Emphysema Father     No Known Problems Sister     No Known Problems Brother     Other Son         Osvaldo-Wiedemann    Squamous cell carcinoma Son         of tongue    Other Son         gilbert's    Lung cancer Maternal Uncle     Bone cancer Maternal Uncle     Breast cancer Maternal Cousin         2nd cousin    Lung cancer Paternal Aunt     Testicular cancer Paternal Uncle     Heart disease Maternal Grandmother     Heart disease Maternal Grandfather        Patient's Medications   New Prescriptions    No medications on file   Previous Medications    LORYNA, 28, 3-0.02 MG PER TABLET    Take 1 tablet by mouth once daily.   Modified Medications    No medications on file   Discontinued Medications    No medications on file       Review of Systems   Constitution: Negative for malaise/fatigue and weight gain.   HENT: Negative for hearing loss.    Eyes: Negative for visual disturbance.   Cardiovascular: Positive for palpitations. Negative for chest pain, claudication, dyspnea on exertion, leg swelling, near-syncope, orthopnea, paroxysmal nocturnal  "dyspnea and syncope.   Respiratory: Negative for cough, shortness of breath, sleep disturbances due to breathing, snoring and wheezing.    Endocrine: Negative for cold intolerance, heat intolerance, polydipsia, polyphagia and polyuria.   Hematologic/Lymphatic: Negative for bleeding problem. Does not bruise/bleed easily.   Skin: Negative for rash and suspicious lesions.   Musculoskeletal: Negative for arthritis, falls, joint pain, muscle weakness and myalgias.   Gastrointestinal: Negative for abdominal pain, change in bowel habit, constipation, diarrhea, heartburn, hematochezia, melena and nausea.   Genitourinary: Negative for hematuria and nocturia.   Neurological: Negative for excessive daytime sleepiness, dizziness, headaches, light-headedness, loss of balance and weakness.   Psychiatric/Behavioral: Negative for depression. The patient is not nervous/anxious.    Allergic/Immunologic: Negative for environmental allergies.       BP (!) 116/55 (BP Location: Left arm, Patient Position: Sitting, BP Method: Large (Automatic))   Pulse 95   Ht 5' 3" (1.6 m)   Wt 63.7 kg (140 lb 6.9 oz)   BMI 24.88 kg/m²     Objective:   Physical Exam   Constitutional: She is oriented to person, place, and time. She appears well-developed and well-nourished.        HENT:   Head: Normocephalic and atraumatic.   Mouth/Throat: Oropharynx is clear and moist.   Eyes: Pupils are equal, round, and reactive to light. Conjunctivae and EOM are normal. No scleral icterus.   Neck: Normal range of motion. Neck supple. No hepatojugular reflux and no JVD present. No tracheal deviation present. No thyromegaly present.   Cardiovascular: Normal rate, regular rhythm, normal heart sounds and intact distal pulses. PMI is not displaced.   Pulses:       Carotid pulses are 2+ on the right side and 2+ on the left side.       Radial pulses are 2+ on the right side and 2+ on the left side.        Dorsalis pedis pulses are 2+ on the right side and 2+ on the left " side.        Posterior tibial pulses are 2+ on the right side and 2+ on the left side.   Pulmonary/Chest: Effort normal and breath sounds normal.   Abdominal: Soft. Bowel sounds are normal. She exhibits no distension and no mass. There is no hepatosplenomegaly. There is no abdominal tenderness.   Musculoskeletal:         General: No tenderness or edema.   Lymphadenopathy:     She has no cervical adenopathy.   Neurological: She is alert and oriented to person, place, and time.   Skin: Skin is warm and dry. No rash noted. No cyanosis or erythema. Nails show no clubbing.   Psychiatric: She has a normal mood and affect. Her speech is normal and behavior is normal.       Lab Results   Component Value Date     11/13/2020    K 4.5 11/13/2020     11/13/2020    CO2 26 11/13/2020    BUN 19 11/13/2020    CREATININE 0.80 11/13/2020    GLU 94 11/13/2020    AST 16 11/13/2020    ALT 20 11/13/2020    ALBUMIN 3.8 11/13/2020    PROT 6.4 11/13/2020    BILITOT 0.5 11/13/2020    WBC 4.4 11/13/2020    HGB 12.6 11/13/2020    HCT 38.8 11/13/2020    MCV 85.5 11/13/2020     11/13/2020    TSH 2.99 11/13/2020    CHOL 145 11/13/2020    HDL 64 11/13/2020    LDLCALC 64 11/13/2020    TRIG 88 11/13/2020       Assessment:     1. Palpitations :  Her physical exam and baseline ECG are normal.  There is no evidence of conduction system disease.  Her symptoms are infrequent and self-limited.  We discussed the option of wearing a Holter monitor if her symptoms become more sustained.   2. Lightheadedness:  I do not see any cardiac cause for her lightheadedness.  It may be related to her chronic sinus issues.  The episodes after cruising sound more related to vertigo or motion sickness.       Plan:     Chelo was seen today for palpitations and atypical chest pain.    Diagnoses and all orders for this visit:    Palpitations  -     Ambulatory referral/consult to Cardiology    Atypical chest pain  -     Ambulatory referral/consult to  Cardiology        Thank you for allowing me to participate in this patient's care. Please do not hesitate to contact me with any questions or concerns.

## 2021-01-26 ENCOUNTER — PATIENT OUTREACH (OUTPATIENT)
Dept: ADMINISTRATIVE | Facility: HOSPITAL | Age: 56
End: 2021-01-26

## 2021-02-15 PROBLEM — Z00.00 NORMAL PHYSICAL EXAM, ROUTINE: Status: RESOLVED | Noted: 2020-11-12 | Resolved: 2021-02-15

## 2021-11-04 ENCOUNTER — LAB VISIT (OUTPATIENT)
Dept: LAB | Facility: HOSPITAL | Age: 56
End: 2021-11-04
Attending: FAMILY MEDICINE
Payer: COMMERCIAL

## 2021-11-04 ENCOUNTER — OFFICE VISIT (OUTPATIENT)
Dept: FAMILY MEDICINE | Facility: CLINIC | Age: 56
End: 2021-11-04
Payer: COMMERCIAL

## 2021-11-04 VITALS
HEIGHT: 63 IN | DIASTOLIC BLOOD PRESSURE: 72 MMHG | WEIGHT: 144.19 LBS | HEART RATE: 86 BPM | OXYGEN SATURATION: 96 % | SYSTOLIC BLOOD PRESSURE: 122 MMHG | BODY MASS INDEX: 25.55 KG/M2

## 2021-11-04 DIAGNOSIS — Z01.818 PRE-OP EXAMINATION: Primary | ICD-10-CM

## 2021-11-04 DIAGNOSIS — Z01.818 PRE-OP EXAMINATION: ICD-10-CM

## 2021-11-04 LAB
ALBUMIN SERPL BCP-MCNC: 3.8 G/DL (ref 3.5–5.2)
ALP SERPL-CCNC: 53 U/L (ref 55–135)
ALT SERPL W/O P-5'-P-CCNC: 28 U/L (ref 10–44)
ANION GAP SERPL CALC-SCNC: 10 MMOL/L (ref 8–16)
AST SERPL-CCNC: 22 U/L (ref 10–40)
BASOPHILS # BLD AUTO: 0.04 K/UL (ref 0–0.2)
BASOPHILS NFR BLD: 0.5 % (ref 0–1.9)
BILIRUB SERPL-MCNC: 0.4 MG/DL (ref 0.1–1)
BUN SERPL-MCNC: 19 MG/DL (ref 6–20)
CALCIUM SERPL-MCNC: 9.7 MG/DL (ref 8.7–10.5)
CHLORIDE SERPL-SCNC: 101 MMOL/L (ref 95–110)
CO2 SERPL-SCNC: 24 MMOL/L (ref 23–29)
CREAT SERPL-MCNC: 0.9 MG/DL (ref 0.5–1.4)
DIFFERENTIAL METHOD: NORMAL
EOSINOPHIL # BLD AUTO: 0 K/UL (ref 0–0.5)
EOSINOPHIL NFR BLD: 0.5 % (ref 0–8)
ERYTHROCYTE [DISTWIDTH] IN BLOOD BY AUTOMATED COUNT: 13.3 % (ref 11.5–14.5)
EST. GFR  (AFRICAN AMERICAN): >60 ML/MIN/1.73 M^2
EST. GFR  (NON AFRICAN AMERICAN): >60 ML/MIN/1.73 M^2
GLUCOSE SERPL-MCNC: 96 MG/DL (ref 70–110)
HCT VFR BLD AUTO: 43.4 % (ref 37–48.5)
HGB BLD-MCNC: 14.2 G/DL (ref 12–16)
IMM GRANULOCYTES # BLD AUTO: 0.01 K/UL (ref 0–0.04)
IMM GRANULOCYTES NFR BLD AUTO: 0.1 % (ref 0–0.5)
LYMPHOCYTES # BLD AUTO: 1.6 K/UL (ref 1–4.8)
LYMPHOCYTES NFR BLD: 20.9 % (ref 18–48)
MCH RBC QN AUTO: 27.8 PG (ref 27–31)
MCHC RBC AUTO-ENTMCNC: 32.7 G/DL (ref 32–36)
MCV RBC AUTO: 85 FL (ref 82–98)
MONOCYTES # BLD AUTO: 1 K/UL (ref 0.3–1)
MONOCYTES NFR BLD: 12.4 % (ref 4–15)
NEUTROPHILS # BLD AUTO: 5 K/UL (ref 1.8–7.7)
NEUTROPHILS NFR BLD: 65.6 % (ref 38–73)
NRBC BLD-RTO: 0 /100 WBC
PLATELET # BLD AUTO: 320 K/UL (ref 150–450)
PMV BLD AUTO: 9.9 FL (ref 9.2–12.9)
POTASSIUM SERPL-SCNC: 4.5 MMOL/L (ref 3.5–5.1)
PROT SERPL-MCNC: 7.6 G/DL (ref 6–8.4)
RBC # BLD AUTO: 5.11 M/UL (ref 4–5.4)
SODIUM SERPL-SCNC: 135 MMOL/L (ref 136–145)
TSH SERPL DL<=0.005 MIU/L-ACNC: 3.03 UIU/ML (ref 0.4–4)
WBC # BLD AUTO: 7.65 K/UL (ref 3.9–12.7)

## 2021-11-04 PROCEDURE — 93010 EKG 12-LEAD: ICD-10-PCS | Mod: S$GLB,,, | Performed by: INTERNAL MEDICINE

## 2021-11-04 PROCEDURE — 3078F DIAST BP <80 MM HG: CPT | Mod: CPTII,S$GLB,, | Performed by: FAMILY MEDICINE

## 2021-11-04 PROCEDURE — 93010 ELECTROCARDIOGRAM REPORT: CPT | Mod: S$GLB,,, | Performed by: INTERNAL MEDICINE

## 2021-11-04 PROCEDURE — 85025 COMPLETE CBC W/AUTO DIFF WBC: CPT | Performed by: FAMILY MEDICINE

## 2021-11-04 PROCEDURE — 99214 PR OFFICE/OUTPT VISIT, EST, LEVL IV, 30-39 MIN: ICD-10-PCS | Mod: S$GLB,,, | Performed by: FAMILY MEDICINE

## 2021-11-04 PROCEDURE — 99999 PR PBB SHADOW E&M-EST. PATIENT-LVL III: ICD-10-PCS | Mod: PBBFAC,,, | Performed by: FAMILY MEDICINE

## 2021-11-04 PROCEDURE — 80053 COMPREHEN METABOLIC PANEL: CPT | Performed by: FAMILY MEDICINE

## 2021-11-04 PROCEDURE — 93005 ELECTROCARDIOGRAM TRACING: CPT | Mod: S$GLB,,, | Performed by: FAMILY MEDICINE

## 2021-11-04 PROCEDURE — 3078F PR MOST RECENT DIASTOLIC BLOOD PRESSURE < 80 MM HG: ICD-10-PCS | Mod: CPTII,S$GLB,, | Performed by: FAMILY MEDICINE

## 2021-11-04 PROCEDURE — 99214 OFFICE O/P EST MOD 30 MIN: CPT | Mod: S$GLB,,, | Performed by: FAMILY MEDICINE

## 2021-11-04 PROCEDURE — 1159F MED LIST DOCD IN RCRD: CPT | Mod: CPTII,S$GLB,, | Performed by: FAMILY MEDICINE

## 2021-11-04 PROCEDURE — 1160F PR REVIEW ALL MEDS BY PRESCRIBER/CLIN PHARMACIST DOCUMENTED: ICD-10-PCS | Mod: CPTII,S$GLB,, | Performed by: FAMILY MEDICINE

## 2021-11-04 PROCEDURE — 3008F PR BODY MASS INDEX (BMI) DOCUMENTED: ICD-10-PCS | Mod: CPTII,S$GLB,, | Performed by: FAMILY MEDICINE

## 2021-11-04 PROCEDURE — 99999 PR PBB SHADOW E&M-EST. PATIENT-LVL III: CPT | Mod: PBBFAC,,, | Performed by: FAMILY MEDICINE

## 2021-11-04 PROCEDURE — 84443 ASSAY THYROID STIM HORMONE: CPT | Performed by: FAMILY MEDICINE

## 2021-11-04 PROCEDURE — 36415 COLL VENOUS BLD VENIPUNCTURE: CPT | Performed by: FAMILY MEDICINE

## 2021-11-04 PROCEDURE — 1159F PR MEDICATION LIST DOCUMENTED IN MEDICAL RECORD: ICD-10-PCS | Mod: CPTII,S$GLB,, | Performed by: FAMILY MEDICINE

## 2021-11-04 PROCEDURE — 3074F SYST BP LT 130 MM HG: CPT | Mod: CPTII,S$GLB,, | Performed by: FAMILY MEDICINE

## 2021-11-04 PROCEDURE — 3074F PR MOST RECENT SYSTOLIC BLOOD PRESSURE < 130 MM HG: ICD-10-PCS | Mod: CPTII,S$GLB,, | Performed by: FAMILY MEDICINE

## 2021-11-04 PROCEDURE — 3008F BODY MASS INDEX DOCD: CPT | Mod: CPTII,S$GLB,, | Performed by: FAMILY MEDICINE

## 2021-11-04 PROCEDURE — 1160F RVW MEDS BY RX/DR IN RCRD: CPT | Mod: CPTII,S$GLB,, | Performed by: FAMILY MEDICINE

## 2021-11-04 PROCEDURE — 93005 EKG 12-LEAD: ICD-10-PCS | Mod: S$GLB,,, | Performed by: FAMILY MEDICINE

## 2022-03-13 DIAGNOSIS — Z12.31 OTHER SCREENING MAMMOGRAM: ICD-10-CM

## 2022-05-30 ENCOUNTER — PATIENT MESSAGE (OUTPATIENT)
Dept: ADMINISTRATIVE | Facility: HOSPITAL | Age: 57
End: 2022-05-30
Payer: COMMERCIAL

## 2022-08-24 ENCOUNTER — PATIENT MESSAGE (OUTPATIENT)
Dept: ADMINISTRATIVE | Facility: HOSPITAL | Age: 57
End: 2022-08-24
Payer: COMMERCIAL

## 2022-09-15 ENCOUNTER — TELEPHONE (OUTPATIENT)
Dept: PRIMARY CARE CLINIC | Facility: CLINIC | Age: 57
End: 2022-09-15
Payer: COMMERCIAL

## 2022-09-15 NOTE — TELEPHONE ENCOUNTER
I sw pt . She is still under your care but states she has been having a lot going on. I scheduled her an appt for Solomon as this is the first available appt at this time

## 2022-09-15 NOTE — TELEPHONE ENCOUNTER
Is this pt still seeing me?  She has not been seen in almost 2 yrs.  Please have her sched a fu appt     Dr. CASTILLO

## 2022-09-21 DIAGNOSIS — Z12.31 OTHER SCREENING MAMMOGRAM: ICD-10-CM

## 2022-09-26 ENCOUNTER — PATIENT MESSAGE (OUTPATIENT)
Dept: ADMINISTRATIVE | Facility: HOSPITAL | Age: 57
End: 2022-09-26
Payer: COMMERCIAL

## 2022-10-10 ENCOUNTER — PATIENT MESSAGE (OUTPATIENT)
Dept: ADMINISTRATIVE | Facility: HOSPITAL | Age: 57
End: 2022-10-10
Payer: COMMERCIAL

## 2023-01-02 NOTE — PROGRESS NOTES
[facility] Note    Chief Complaint      Chief Complaint   Patient presents with    Follow-up       History of Present Illness      Chelo Lindquist is a 57 y.o. WF with h/o Breast fibroadenoma presents to  fu well visit.  Note pt had recent abdominoplasty and repair of abd wall muscle with ubilical hernia repair. Last visit - 20     Left Breast fibroadenoma - Pt fu by [facility].    Pt prev told she had Breast Ca by prev OB/GYN - Dr. Pope and went to [facility] and had multiple biopsies and had no evidence of malignancy. She continues to fu with [facility].     Palpitations - She saw Cristobal, Dr. Freeman and Dr. Avila, in past.  She had a Holter and echo and these were normal.  These resolved.  She prefers not to go back to Dr. Freeman as sister-in-law  while under his care.      Dizziness - Resolved.     Colon polyp - Repeat C-scope due 2026.     HCM - Flu - 22;  Tdap - 14 At [facility];  PCV 13 - none;  PVX 23 - none;  Shingrix - none - defers; COVID 19 Vaccine # 1 3/29/21; # 2 21; #3 ;  3D MGM - 22 at [facility]  - repeat 1 yr; h/o Breast Bx - 18;  DEXA - none;  PAP - 7/10/17 and HPV neg- due- gets at [facility]; Hep C Screen - 20 - neg.; HIV Screen - utd - neg. Per pt; C-scope - 21 - hemorrhoids and polyps - repeat 5 yrs;    Ob/GYN - Dr. Talley at [facility]; Optometrist - Dr. Finkelstein; Cards - Dr. Avila; GI - Dr. Stokes; Vascular SX - Dr Felix    Patient Care Team:  Kierra Fernandez MD as PCP - General (Internal Medicine)  Teri Marsh MA as Care Coordinator  Mio Stokes MD as Consulting Physician (Gastroenterology)     Health Maintenance:  Immunization History   Administered Date(s) Administered    Influenza - Quadrivalent - MDCK - PF 10/02/2020    Influenza - Quadrivalent - PF *Preferred* (6 months and older) 10/04/2017, 2018, 2019    Influenza - Trivalent (ADULT) 10/14/2014    Influenza - Trivalent - PF (ADULT) 10/02/2015, 2016    Tdap 2014       Health Maintenance   Topic Date Due    Mammogram  2021    TETANUS VACCINE  2024    Lipid Panel  2025    Hepatitis C Screening  Completed        Past Medical History:  Past Medical History:   Diagnosis Date    Breast fibroadenoma     Personal history of colonic polyps     Varicose veins of legs     Vertigo        Past Surgical History:   has a past surgical history that includes Breast biopsy; ear cyst; Breast cyst excision; Laparoscopy; Ulnar nerve repair; Carpal tunnel release (Right);  section; Dilation and curettage of uterus; Breast biopsy; Abdominoplasty; Umbilical hernia repair; and Vein ligation.    Family History:  family history includes Bone cancer in her maternal uncle; Breast cancer in her maternal cousin; Diabetes in her father; Emphysema in her father; Heart disease in her maternal grandfather and maternal grandmother; Hypertension in her father; Lung cancer in her maternal uncle and paternal aunt; No Known Problems in her brother and sister; Other in her son and son; Squamous cell carcinoma in her son; Testicular cancer in her paternal uncle; Thyroid disease in her mother.     Social History:  Social History     Tobacco Use    Smoking status: Never    Smokeless tobacco: Never   Substance Use Topics    Alcohol use: Yes     Comment: 1 drink Q 2 wks    Drug use: Never       Review of Systems   Constitutional:  Negative for chills, diaphoresis and fever.   HENT:  Negative for hearing loss.    Eyes:  Positive for visual disturbance.        She wears readers.    Respiratory:  Negative for chest tightness and shortness of breath.    Cardiovascular:  Negative for chest pain and palpitations.   Gastrointestinal:  Negative for abdominal pain, blood in stool, constipation, diarrhea, nausea and vomiting.   Endocrine: Negative for cold intolerance, heat intolerance and polydipsia.   Genitourinary:  Negative for dysuria and frequency.   Musculoskeletal:  Positive for arthralgias.  "Negative for myalgias.        Fingers - on days she does more work with her hands.  Can try Voltaren gel OTC prn.    Neurological:  Negative for dizziness and headaches.   Psychiatric/Behavioral:  Negative for dysphoric mood. The patient is not nervous/anxious.       Medications:    Current Outpatient Medications:     LORYNA, 28, 3-0.02 mg per tablet, Take 1 tablet by mouth once daily., Disp: , Rfl:      Allergies:  Review of patient's allergies indicates:   Allergen Reactions    Morphine Hives       Physical Exam      Vital Signs  Temp: 98.5 °F (36.9 °C)  Temp src: Oral  Pulse: 93  Resp: 20  SpO2: 98 %  BP: 120/70  BP Location: Right arm  Patient Position: Sitting  Pain Score: 0-No pain  Height and Weight  Height: 5' 3" (160 cm)  Weight: 67 kg (147 lb 11.3 oz)  BSA (Calculated - sq m): 1.73 sq meters  BMI (Calculated): 26.2  Weight in (lb) to have BMI = 25: 140.8      Patient Position: Sitting      Physical Exam  Vitals reviewed.   Constitutional:       General: She is not in acute distress.     Appearance: Normal appearance. She is not ill-appearing, toxic-appearing or diaphoretic.   HENT:      Head: Normocephalic and atraumatic.      Right Ear: Tympanic membrane normal.      Left Ear: Tympanic membrane normal.   Eyes:      Extraocular Movements: Extraocular movements intact.      Conjunctiva/sclera: Conjunctivae normal.      Pupils: Pupils are equal, round, and reactive to light.   Neck:      Vascular: No carotid bruit.   Cardiovascular:      Rate and Rhythm: Normal rate and regular rhythm.      Pulses: Normal pulses.      Heart sounds: Normal heart sounds.   Pulmonary:      Effort: Pulmonary effort is normal. No respiratory distress.      Breath sounds: Normal breath sounds.   Abdominal:      General: Bowel sounds are normal. There is no distension.      Palpations: Abdomen is soft.      Tenderness: There is no abdominal tenderness. There is no guarding or rebound.   Musculoskeletal:      Cervical back: Neck " supple. No tenderness.   Neurological:      General: No focal deficit present.      Mental Status: She is alert and oriented to person, place, and time.   Psychiatric:         Mood and Affect: Mood normal.         Behavior: Behavior normal.        Laboratory:  CBC:  Recent Labs   Lab 11/13/20 0745 11/04/21  1147   WBC 4.4 7.65   RBC 4.54 5.11   Hemoglobin 12.6 14.2   Hematocrit 38.8 43.4   Platelets 286 320   MCV 85.5 85   MCH 27.8 27.8   MCHC 32.5 32.7       CMP:  Recent Labs   Lab 11/13/20  0745 11/04/21  1147   Glucose 94 96   Calcium 9.1 9.7   Albumin 3.8 3.8   Total Protein 6.4 7.6   Sodium 138 135 L   Potassium 4.5 4.5   CO2 26 24   Chloride 105 101   BUN 19 19   Creatinine 0.80 0.9   Alkaline Phosphatase  --  53 L   ALT 20 28   AST 16 22   Total Bilirubin 0.5 0.4       LIPIDS:  Recent Labs   Lab 11/13/20  0745 11/04/21  1147   TSH 2.99 3.028   HDL 64  --    Cholesterol 145  --    Triglycerides 88  --    LDL Cholesterol 64  --    HDL/Cholesterol Ratio 2.3  --    Non HDL Chol. (LDL+VLDL) 81  --        TSH:  Recent Labs   Lab 11/13/20  0745 11/04/21  1147   TSH 2.99 3.028         Recent Labs   Lab 11/13/20  0748   Hepatitis C Ab NON-REACTIVE       Assessment/Plan     Chelo Lindquist is a 57 y.o.female with:    Normal physical exam, routine  -     TSH; Future; Expected date: 01/03/2023  -     T4, Free; Future; Expected date: 01/03/2023  -     Comprehensive Metabolic Panel; Future; Expected date: 01/03/2023  -     CBC Auto Differential; Future; Expected date: 01/03/2023  - Performed today.  Will check Basic labs.  RTC in 1 yr for fu or sooner if needed    Fibroadenoma of left breast  - Fu at [facility].     Screening for lipoid disorders  -     Lipid Panel; Future; Expected date: 01/03/2023      Chronic conditions status updated as per HPI.  Other than changes above, cont current medications and maintain follow up with specialists.  Follow up in about 1 year (around 1/3/2024) for well visit or sooner if needed.       Kierra Fernandez MD  [facility]

## 2023-01-03 ENCOUNTER — OFFICE VISIT (OUTPATIENT)
Dept: PRIMARY CARE CLINIC | Facility: CLINIC | Age: 58
End: 2023-01-03
Payer: COMMERCIAL

## 2023-01-03 VITALS
BODY MASS INDEX: 26.17 KG/M2 | SYSTOLIC BLOOD PRESSURE: 120 MMHG | HEIGHT: 63 IN | TEMPERATURE: 99 F | RESPIRATION RATE: 20 BRPM | OXYGEN SATURATION: 98 % | DIASTOLIC BLOOD PRESSURE: 70 MMHG | WEIGHT: 147.69 LBS | HEART RATE: 93 BPM

## 2023-01-03 DIAGNOSIS — Z13.220 SCREENING FOR LIPOID DISORDERS: ICD-10-CM

## 2023-01-03 DIAGNOSIS — Z00.00 NORMAL PHYSICAL EXAM, ROUTINE: Primary | ICD-10-CM

## 2023-01-03 DIAGNOSIS — D24.2 FIBROADENOMA OF LEFT BREAST: ICD-10-CM

## 2023-01-03 PROBLEM — I83.93 ASYMPTOMATIC VARICOSE VEINS OF BOTH LOWER EXTREMITIES: Status: ACTIVE | Noted: 2023-01-03

## 2023-01-03 PROCEDURE — 3008F BODY MASS INDEX DOCD: CPT | Mod: CPTII,S$GLB,, | Performed by: INTERNAL MEDICINE

## 2023-01-03 PROCEDURE — 99396 PREV VISIT EST AGE 40-64: CPT | Mod: S$GLB,,, | Performed by: INTERNAL MEDICINE

## 2023-01-03 PROCEDURE — 1160F RVW MEDS BY RX/DR IN RCRD: CPT | Mod: CPTII,S$GLB,, | Performed by: INTERNAL MEDICINE

## 2023-01-03 PROCEDURE — 3008F PR BODY MASS INDEX (BMI) DOCUMENTED: ICD-10-PCS | Mod: CPTII,S$GLB,, | Performed by: INTERNAL MEDICINE

## 2023-01-03 PROCEDURE — 1160F PR REVIEW ALL MEDS BY PRESCRIBER/CLIN PHARMACIST DOCUMENTED: ICD-10-PCS | Mod: CPTII,S$GLB,, | Performed by: INTERNAL MEDICINE

## 2023-01-03 PROCEDURE — 3074F PR MOST RECENT SYSTOLIC BLOOD PRESSURE < 130 MM HG: ICD-10-PCS | Mod: CPTII,S$GLB,, | Performed by: INTERNAL MEDICINE

## 2023-01-03 PROCEDURE — 99396 PR PREVENTIVE VISIT,EST,40-64: ICD-10-PCS | Mod: S$GLB,,, | Performed by: INTERNAL MEDICINE

## 2023-01-03 PROCEDURE — 3078F PR MOST RECENT DIASTOLIC BLOOD PRESSURE < 80 MM HG: ICD-10-PCS | Mod: CPTII,S$GLB,, | Performed by: INTERNAL MEDICINE

## 2023-01-03 PROCEDURE — 99999 PR PBB SHADOW E&M-EST. PATIENT-LVL IV: ICD-10-PCS | Mod: PBBFAC,,, | Performed by: INTERNAL MEDICINE

## 2023-01-03 PROCEDURE — 3074F SYST BP LT 130 MM HG: CPT | Mod: CPTII,S$GLB,, | Performed by: INTERNAL MEDICINE

## 2023-01-03 PROCEDURE — 3078F DIAST BP <80 MM HG: CPT | Mod: CPTII,S$GLB,, | Performed by: INTERNAL MEDICINE

## 2023-01-03 PROCEDURE — 1159F PR MEDICATION LIST DOCUMENTED IN MEDICAL RECORD: ICD-10-PCS | Mod: CPTII,S$GLB,, | Performed by: INTERNAL MEDICINE

## 2023-01-03 PROCEDURE — 1159F MED LIST DOCD IN RCRD: CPT | Mod: CPTII,S$GLB,, | Performed by: INTERNAL MEDICINE

## 2023-01-03 PROCEDURE — 99999 PR PBB SHADOW E&M-EST. PATIENT-LVL IV: CPT | Mod: PBBFAC,,, | Performed by: INTERNAL MEDICINE

## 2023-04-03 ENCOUNTER — PATIENT MESSAGE (OUTPATIENT)
Dept: ADMINISTRATIVE | Facility: HOSPITAL | Age: 58
End: 2023-04-03
Payer: COMMERCIAL

## 2024-02-08 ENCOUNTER — PATIENT OUTREACH (OUTPATIENT)
Dept: ADMINISTRATIVE | Facility: HOSPITAL | Age: 59
End: 2024-02-08
Payer: COMMERCIAL

## 2024-02-08 LAB — PAP RECOMMENDATION EXT: NORMAL

## 2024-02-08 NOTE — PROGRESS NOTES
Health Maintenance Due   Topic Date Due    HIV Screening  Never done    Hemoglobin A1c (Diabetic Prevention Screening)  Never done    Shingles Vaccine (1 of 2) Never done    COVID-19 Vaccine (3 - 2023-24 season) 09/01/2023        Chart review done.   HM updated.   Immunizations reviewed & updated.   Care Everywhere updated.
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
## 2025-02-05 ENCOUNTER — LAB VISIT (OUTPATIENT)
Dept: LAB | Facility: HOSPITAL | Age: 60
End: 2025-02-05
Attending: INTERNAL MEDICINE
Payer: COMMERCIAL

## 2025-02-05 ENCOUNTER — HOSPITAL ENCOUNTER (OUTPATIENT)
Dept: RADIOLOGY | Facility: HOSPITAL | Age: 60
Discharge: HOME | End: 2025-02-05
Attending: INTERNAL MEDICINE
Payer: COMMERCIAL

## 2025-02-05 ENCOUNTER — OFFICE VISIT (OUTPATIENT)
Dept: PRIMARY CARE CLINIC | Facility: CLINIC | Age: 60
End: 2025-02-05
Payer: COMMERCIAL

## 2025-02-05 VITALS
BODY MASS INDEX: 22.73 KG/M2 | HEART RATE: 88 BPM | RESPIRATION RATE: 16 BRPM | WEIGHT: 128.31 LBS | TEMPERATURE: 99 F | SYSTOLIC BLOOD PRESSURE: 124 MMHG | DIASTOLIC BLOOD PRESSURE: 80 MMHG | HEIGHT: 63 IN | OXYGEN SATURATION: 99 %

## 2025-02-05 DIAGNOSIS — Z00.00 NORMAL PHYSICAL EXAM, ROUTINE: ICD-10-CM

## 2025-02-05 DIAGNOSIS — R06.2 WHEEZING: ICD-10-CM

## 2025-02-05 DIAGNOSIS — Z13.220 SCREENING FOR LIPOID DISORDERS: ICD-10-CM

## 2025-02-05 DIAGNOSIS — Z00.00 NORMAL PHYSICAL EXAM, ROUTINE: Primary | ICD-10-CM

## 2025-02-05 DIAGNOSIS — N95.0 POSTMENOPAUSAL BLEEDING: ICD-10-CM

## 2025-02-05 DIAGNOSIS — Z23 NEED FOR TDAP VACCINATION: ICD-10-CM

## 2025-02-05 DIAGNOSIS — R19.5 CHANGE IN STOOL: ICD-10-CM

## 2025-02-05 LAB
ALBUMIN SERPL BCP-MCNC: 4.1 G/DL (ref 3.5–5.2)
ALP SERPL-CCNC: 55 U/L (ref 40–150)
ALT SERPL W/O P-5'-P-CCNC: 31 U/L (ref 10–44)
ANION GAP SERPL CALC-SCNC: 12 MMOL/L (ref 8–16)
AST SERPL-CCNC: 27 U/L (ref 10–40)
BASOPHILS # BLD AUTO: 0.07 K/UL (ref 0–0.2)
BASOPHILS NFR BLD: 1.2 % (ref 0–1.9)
BILIRUB SERPL-MCNC: 0.7 MG/DL (ref 0.1–1)
BUN SERPL-MCNC: 12 MG/DL (ref 6–20)
CALCIUM SERPL-MCNC: 9.7 MG/DL (ref 8.7–10.5)
CHLORIDE SERPL-SCNC: 102 MMOL/L (ref 95–110)
CHOLEST SERPL-MCNC: 199 MG/DL (ref 120–199)
CHOLEST/HDLC SERPL: 2.8 {RATIO} (ref 2–5)
CO2 SERPL-SCNC: 21 MMOL/L (ref 23–29)
CREAT SERPL-MCNC: 0.8 MG/DL (ref 0.5–1.4)
DIFFERENTIAL METHOD BLD: NORMAL
EOSINOPHIL # BLD AUTO: 0 K/UL (ref 0–0.5)
EOSINOPHIL NFR BLD: 0.3 % (ref 0–8)
ERYTHROCYTE [DISTWIDTH] IN BLOOD BY AUTOMATED COUNT: 13.7 % (ref 11.5–14.5)
EST. GFR  (NO RACE VARIABLE): >60 ML/MIN/1.73 M^2
GLUCOSE SERPL-MCNC: 80 MG/DL (ref 70–110)
HCT VFR BLD AUTO: 44 % (ref 37–48.5)
HDLC SERPL-MCNC: 72 MG/DL (ref 40–75)
HDLC SERPL: 36.2 % (ref 20–50)
HGB BLD-MCNC: 14.2 G/DL (ref 12–16)
IMM GRANULOCYTES # BLD AUTO: 0.01 K/UL (ref 0–0.04)
IMM GRANULOCYTES NFR BLD AUTO: 0.2 % (ref 0–0.5)
LDLC SERPL CALC-MCNC: 114.4 MG/DL (ref 63–159)
LYMPHOCYTES # BLD AUTO: 1.7 K/UL (ref 1–4.8)
LYMPHOCYTES NFR BLD: 29.5 % (ref 18–48)
MCH RBC QN AUTO: 27.7 PG (ref 27–31)
MCHC RBC AUTO-ENTMCNC: 32.3 G/DL (ref 32–36)
MCV RBC AUTO: 86 FL (ref 82–98)
MONOCYTES # BLD AUTO: 0.6 K/UL (ref 0.3–1)
MONOCYTES NFR BLD: 10.4 % (ref 4–15)
NEUTROPHILS # BLD AUTO: 3.4 K/UL (ref 1.8–7.7)
NEUTROPHILS NFR BLD: 58.4 % (ref 38–73)
NONHDLC SERPL-MCNC: 127 MG/DL
NRBC BLD-RTO: 0 /100 WBC
PLATELET # BLD AUTO: 338 K/UL (ref 150–450)
PMV BLD AUTO: 11 FL (ref 9.2–12.9)
POTASSIUM SERPL-SCNC: 4.3 MMOL/L (ref 3.5–5.1)
PROT SERPL-MCNC: 7.6 G/DL (ref 6–8.4)
RBC # BLD AUTO: 5.13 M/UL (ref 4–5.4)
SODIUM SERPL-SCNC: 135 MMOL/L (ref 136–145)
TRIGL SERPL-MCNC: 63 MG/DL (ref 30–150)
TSH SERPL DL<=0.005 MIU/L-ACNC: 1.89 UIU/ML (ref 0.4–4)
WBC # BLD AUTO: 5.89 K/UL (ref 3.9–12.7)

## 2025-02-05 PROCEDURE — 1160F RVW MEDS BY RX/DR IN RCRD: CPT | Mod: CPTII,S$GLB,, | Performed by: INTERNAL MEDICINE

## 2025-02-05 PROCEDURE — 99396 PREV VISIT EST AGE 40-64: CPT | Mod: 25,S$GLB,, | Performed by: INTERNAL MEDICINE

## 2025-02-05 PROCEDURE — 3008F BODY MASS INDEX DOCD: CPT | Mod: CPTII,S$GLB,, | Performed by: INTERNAL MEDICINE

## 2025-02-05 PROCEDURE — 80053 COMPREHEN METABOLIC PANEL: CPT | Performed by: INTERNAL MEDICINE

## 2025-02-05 PROCEDURE — 84443 ASSAY THYROID STIM HORMONE: CPT | Performed by: INTERNAL MEDICINE

## 2025-02-05 PROCEDURE — 36415 COLL VENOUS BLD VENIPUNCTURE: CPT | Mod: PN | Performed by: INTERNAL MEDICINE

## 2025-02-05 PROCEDURE — 90471 IMMUNIZATION ADMIN: CPT | Mod: S$GLB,,, | Performed by: INTERNAL MEDICINE

## 2025-02-05 PROCEDURE — 80061 LIPID PANEL: CPT | Performed by: INTERNAL MEDICINE

## 2025-02-05 PROCEDURE — 71046 X-RAY EXAM CHEST 2 VIEWS: CPT | Mod: TC

## 2025-02-05 PROCEDURE — 3079F DIAST BP 80-89 MM HG: CPT | Mod: CPTII,S$GLB,, | Performed by: INTERNAL MEDICINE

## 2025-02-05 PROCEDURE — 71046 X-RAY EXAM CHEST 2 VIEWS: CPT | Mod: 26,,, | Performed by: RADIOLOGY

## 2025-02-05 PROCEDURE — 1159F MED LIST DOCD IN RCRD: CPT | Mod: CPTII,S$GLB,, | Performed by: INTERNAL MEDICINE

## 2025-02-05 PROCEDURE — 85025 COMPLETE CBC W/AUTO DIFF WBC: CPT | Performed by: INTERNAL MEDICINE

## 2025-02-05 PROCEDURE — 90715 TDAP VACCINE 7 YRS/> IM: CPT | Mod: S$GLB,,, | Performed by: INTERNAL MEDICINE

## 2025-02-05 PROCEDURE — 99999 PR PBB SHADOW E&M-EST. PATIENT-LVL IV: CPT | Mod: PBBFAC,,, | Performed by: INTERNAL MEDICINE

## 2025-02-05 PROCEDURE — 3074F SYST BP LT 130 MM HG: CPT | Mod: CPTII,S$GLB,, | Performed by: INTERNAL MEDICINE

## 2025-02-05 RX ORDER — ALBUTEROL SULFATE 90 UG/1
2 INHALANT RESPIRATORY (INHALATION) EVERY 6 HOURS PRN
Qty: 18 G | Refills: 0 | Status: SHIPPED | OUTPATIENT
Start: 2025-02-05 | End: 2026-02-05

## 2025-02-06 ENCOUNTER — PATIENT MESSAGE (OUTPATIENT)
Dept: PRIMARY CARE CLINIC | Facility: CLINIC | Age: 60
End: 2025-02-06
Payer: COMMERCIAL

## 2025-02-06 NOTE — PROGRESS NOTES
I sent pt a my chart message -  I reviewed your  labs.  Your thyroid functions are normal.  Your Cholesterol looked good.  Your kidney function and liver functions looked good. Your sodium was borderline low but stable at 135. The Co2 level was slightly low but not a concerning level.  Perhaps if you had some diarrhea lately that may have been associated with this finding.   You are not anemic. No further recommendations at this time.  Dr. CASTILLO

## 2025-03-05 DIAGNOSIS — Z12.31 OTHER SCREENING MAMMOGRAM: ICD-10-CM

## 2025-03-07 ENCOUNTER — PATIENT MESSAGE (OUTPATIENT)
Dept: PRIMARY CARE CLINIC | Facility: CLINIC | Age: 60
End: 2025-03-07
Payer: COMMERCIAL

## 2025-03-07 DIAGNOSIS — Z78.0 POSTMENOPAUSAL: Primary | ICD-10-CM

## 2025-03-18 ENCOUNTER — CLINICAL SUPPORT (OUTPATIENT)
Dept: OBSTETRICS AND GYNECOLOGY | Facility: CLINIC | Age: 60
End: 2025-03-18
Payer: COMMERCIAL

## 2025-03-18 DIAGNOSIS — N95.1 MENOPAUSAL SYMPTOMS: Primary | ICD-10-CM

## 2025-03-19 NOTE — PROGRESS NOTES
Personal Information    Full Name: Chelo Lindquist  1965    PCP- Dr. Kierra Fernandez    Medical History    Do you have a chronic medical condition? (e.g., diabetes, hypertension, thyroid issues)   If yes, please specify:   No    2.   Do you have a history of hormone- related conditions? (e.g., endometriosis, breast cancer)   If yes, please explain:   Yes - Fibrocystic Breast     3.   Have you previously or are you currently taking hormone replacement therapy?   If yes, please list:   No- Interested in pellets,     Menstrual History    At what age did you begin menstruating?   14    2.   Have you experienced any changes in your menstrual cycle in the last year?   If yes, please describe:   Yes - Arlen OCP's until 1/2025    3.   When was your last menstrual period or age of last period?   N/A    4.   Have you had a hysterectomy?   If yes, at what age?  No    Symptoms Assesments      Are you experiencing any of the following symptoms:  Hot Flashes: Yes   Night Sweats: Yes   Vaginal Dryness or Pain with Plainedge: No   Mood Swings: Yes   Anxiety or Depression: No   Sleep Disturbances: Yes   Fatigue: No   Weight Gain: No   Joint or Muscle Pain: Yes   Memory Issues or Brain Fog: No   Decreased Interest in Sex (Low Libido): No  Colonoscopy: Yes 5 years ago      Lifestyle Factors    How would you describe your diet?  Balanced    2.   How often do you exercise?   Regularly    3.   Do you smoke or consume alcohol?   If yes, please specify frequency:   No    4.   How would you rate your stress levels?   Low- Moderate    Family History    Is there a family history of menopause-related conditions? (e.g., osteoporosis, breast cancer)  If yes, please specify  Yes - maternal  2nd cousin  Breast cancer     2.   Is there a family history of heart disease?   If yes, please specify   No

## 2025-04-28 ENCOUNTER — PATIENT MESSAGE (OUTPATIENT)
Dept: OBSTETRICS AND GYNECOLOGY | Facility: CLINIC | Age: 60
End: 2025-04-28

## 2025-04-28 ENCOUNTER — OFFICE VISIT (OUTPATIENT)
Dept: OBSTETRICS AND GYNECOLOGY | Facility: CLINIC | Age: 60
End: 2025-04-28
Payer: COMMERCIAL

## 2025-04-28 VITALS — WEIGHT: 129 LBS | SYSTOLIC BLOOD PRESSURE: 112 MMHG | BODY MASS INDEX: 22.85 KG/M2 | DIASTOLIC BLOOD PRESSURE: 68 MMHG

## 2025-04-28 DIAGNOSIS — Z78.0 POSTMENOPAUSAL: ICD-10-CM

## 2025-04-28 DIAGNOSIS — N95.9 POSTMENOPAUSAL SYMPTOMS: Primary | ICD-10-CM

## 2025-04-28 PROCEDURE — 99204 OFFICE O/P NEW MOD 45 MIN: CPT | Mod: S$GLB,,, | Performed by: NURSE PRACTITIONER

## 2025-04-28 PROCEDURE — 3074F SYST BP LT 130 MM HG: CPT | Mod: CPTII,S$GLB,, | Performed by: NURSE PRACTITIONER

## 2025-04-28 PROCEDURE — 1159F MED LIST DOCD IN RCRD: CPT | Mod: CPTII,S$GLB,, | Performed by: NURSE PRACTITIONER

## 2025-04-28 PROCEDURE — 3078F DIAST BP <80 MM HG: CPT | Mod: CPTII,S$GLB,, | Performed by: NURSE PRACTITIONER

## 2025-04-28 PROCEDURE — 99999 PR PBB SHADOW E&M-EST. PATIENT-LVL III: CPT | Mod: PBBFAC,,, | Performed by: NURSE PRACTITIONER

## 2025-04-28 PROCEDURE — 1160F RVW MEDS BY RX/DR IN RCRD: CPT | Mod: CPTII,S$GLB,, | Performed by: NURSE PRACTITIONER

## 2025-04-28 PROCEDURE — 3008F BODY MASS INDEX DOCD: CPT | Mod: CPTII,S$GLB,, | Performed by: NURSE PRACTITIONER

## 2025-04-28 RX ORDER — PROGESTERONE 100 MG/1
100 CAPSULE ORAL NIGHTLY
Qty: 30 CAPSULE | Refills: 11 | Status: SHIPPED | OUTPATIENT
Start: 2025-04-28 | End: 2026-04-28

## 2025-04-28 NOTE — PROGRESS NOTES
Subjective:      Chelo Lindquist is a 59 y.o. female who presents to discuss hormone replacement therapy. Long term use of OCP, stopped in January with major menopausal symptoms. She then restarted her OCP with symptom relief, completed placebo pills last week and did not restart the birth control.     Patient is requesting hormone replacement therapy due to hot flashes, insomnia, and no energy, hot flashes.The patient is not taking hormone replacement therapy. Patient denies post-menopausal vaginal bleeding. The patient is sexually active.  She denies the following contraindications to HRT:  Vaginal bleeding, history of VTE/PE, thrombophilia,  breast cancer, or active liver disease.       Scheduled for WWE at [facility] in [place] in June. History of breast fibroadenoma, she is scheduled for repeat mammogram with [facility]. All generalist gynecology care with [facility] planned.     Pap smear: 2/8/2024  Mammogram: 2024    Lab Visit on 02/05/2025   Component Date Value Ref Range Status    WBC 02/05/2025 5.89  3.90 - 12.70 K/uL Final    RBC 02/05/2025 5.13  4.00 - 5.40 M/uL Final    Hemoglobin 02/05/2025 14.2  12.0 - 16.0 g/dL Final    Hematocrit 02/05/2025 44.0  37.0 - 48.5 % Final    MCV 02/05/2025 86  82 - 98 fL Final    MCH 02/05/2025 27.7  27.0 - 31.0 pg Final    MCHC 02/05/2025 32.3  32.0 - 36.0 g/dL Final    RDW 02/05/2025 13.7  11.5 - 14.5 % Final    Platelets 02/05/2025 338  150 - 450 K/uL Final    MPV 02/05/2025 11.0  9.2 - 12.9 fL Final    Immature Granulocytes 02/05/2025 0.2  0.0 - 0.5 % Final    Gran # (ANC) 02/05/2025 3.4  1.8 - 7.7 K/uL Final    Immature Grans (Abs) 02/05/2025 0.01  0.00 - 0.04 K/uL Final    Lymph # 02/05/2025 1.7  1.0 - 4.8 K/uL Final    Mono # 02/05/2025 0.6  0.3 - 1.0 K/uL Final    Eos # 02/05/2025 0.0  0.0 - 0.5 K/uL Final    Baso # 02/05/2025 0.07  0.00 - 0.20 K/uL Final    nRBC 02/05/2025 0  0 /100 WBC Final    Gran % 02/05/2025 58.4  38.0 - 73.0 % Final    Lymph %  2025 29.5  18.0 - 48.0 % Final    Mono % 2025 10.4  4.0 - 15.0 % Final    Eosinophil % 2025 0.3  0.0 - 8.0 % Final    Basophil % 2025 1.2  0.0 - 1.9 % Final    Differential Method 2025 Automated   Final    Sodium 2025 135 (L)  136 - 145 mmol/L Final    Potassium 2025 4.3  3.5 - 5.1 mmol/L Final    Chloride 2025 102  95 - 110 mmol/L Final    CO2 2025 21 (L)  23 - 29 mmol/L Final    Glucose 2025 80  70 - 110 mg/dL Final    BUN 2025 12  6 - 20 mg/dL Final    Creatinine 2025 0.8  0.5 - 1.4 mg/dL Final    Calcium 2025 9.7  8.7 - 10.5 mg/dL Final    Total Protein 2025 7.6  6.0 - 8.4 g/dL Final    Albumin 2025 4.1  3.5 - 5.2 g/dL Final    Total Bilirubin 2025 0.7  0.1 - 1.0 mg/dL Final    Alkaline Phosphatase 2025 55  40 - 150 U/L Final    AST 2025 27  10 - 40 U/L Final    ALT 2025 31  10 - 44 U/L Final    eGFR 2025 >60.0  >60 mL/min/1.73 m^2 Final    Anion Gap 2025 12  8 - 16 mmol/L Final    Cholesterol 2025 199  120 - 199 mg/dL Final    Triglycerides 2025 63  30 - 150 mg/dL Final    HDL 2025 72  40 - 75 mg/dL Final    LDL Cholesterol 2025 114.4  63.0 - 159.0 mg/dL Final    HDL/Cholesterol Ratio 2025 36.2  20.0 - 50.0 % Final    Total Cholesterol/HDL Ratio 2025 2.8  2.0 - 5.0 Final    Non-HDL Cholesterol 2025 127  mg/dL Final    TSH 2025 1.891  0.400 - 4.000 uIU/mL Final       Past Medical History:   Diagnosis Date    Breast fibroadenoma     Personal history of colonic polyps     Varicose veins of legs     Vertigo      Past Surgical History:   Procedure Laterality Date    ABDOMINOPLASTY      23    BREAST BIOPSY      punch biopsies    BREAST BIOPSY      stereotactic    BREAST CYST EXCISION      CARPAL TUNNEL RELEASE Right      SECTION      x 1    DILATION AND CURETTAGE OF UTERUS      s/p miscarriage x2    ear cyst      LAPAROSCOPY       ULNAR NERVE REPAIR      UMBILICAL HERNIA REPAIR      VEIN LIGATION       Social History[1]  Family History   Problem Relation Name Age of Onset    Heart disease Maternal Grandmother Reyna Ubaldo     Heart disease Maternal Grandfather Heber Ubaldo     Hypertension Father Real Ubaldo  Ubaldo     Diabetes Father Real Etna  Etna     Emphysema Father Real Etna  Etna     Arthritis Father Real Etna  Etna     Asthma Father Real Etna  Ubaldo     COPD Father Real Etna  Ubaldo     Thyroid disease Mother      No Known Problems Brother      No Known Problems Sister      Cancer Son Duane Bondsuy IV     Squamous cell carcinoma Son Duane Lindquist IV         of tongue    Single kidney Son Brenten     Lung cancer Maternal Uncle      Bone cancer Maternal Uncle      Lung cancer Paternal Aunt      Testicular cancer Paternal Uncle      Breast cancer Maternal Cousin          2nd cousin     OB History    Para Term  AB Living   3 1  1 2 3   SAB IAB Ectopic Multiple Live Births   2   1 3      # Outcome Date GA Lbr Jovi/2nd Weight Sex Type Anes PTL Lv   3 2003 20w0d    SAB         Birth Comments: D&C Done   2 2002 16w0d    SAB         Birth Comments: Twins - D&C Done   1A  98 34w0d  2.041 kg (4 lb 8 oz) M CS-LTranv   HIRAM      Birth Comments: Triplet #1   1B  98 34w0d  1.758 kg (3 lb 14 oz) M CS-LTranv   HIRAM      Birth Comments: Triplet #2   1C  98 34w0d  1.644 kg (3 lb 10 oz) M CS-LTranv   HIRAM      Birth Comments: Triplet #3     Current Medications[2]    Vitals:    25 0923   BP: 112/68   Weight: 58.5 kg (129 lb)     Body mass index is 22.85 kg/m².    Assessment:    Postmenopausal  -     Ambulatory referral[facility]ult to [facility]        Plan:   Risks and benefits of hormone replacement therapy were discussed.  Hormone replacement therapy options, including bioidentical versus non-bioidentical  hormones, as well as alternatives discussed.    Plan for HRT lab work in 3 weeks    Start:   Progesterone 100 mg orally QPM    Please to discuss initiation of Estradiol therapy if needed at next visit.     Follow up in 4 weeks  Will recheck labs once on typical optimal dose or if having side effects.  Instructed patient to call if she experiences any side effects or has any questions.       SUSAN Humphrey             [1]   Social History  Tobacco Use    Smoking status: Never    Smokeless tobacco: Never   Substance Use Topics    Alcohol use: Yes     Comment: 1 drink Q 2 wks    Drug use: Never   [2]   Current Outpatient Medications:     albuterol (VENTOLIN HFA) 90 mcg/actuation inhaler, Inhale 2 puffs into the lungs every 6 (six) hours as needed for Wheezing. Rescue, Disp: 18 g, Rfl: 0

## 2025-05-19 ENCOUNTER — LAB VISIT (OUTPATIENT)
Dept: LAB | Facility: HOSPITAL | Age: 60
End: 2025-05-19
Attending: NURSE PRACTITIONER
Payer: COMMERCIAL

## 2025-05-19 DIAGNOSIS — N95.9 POSTMENOPAUSAL SYMPTOMS: ICD-10-CM

## 2025-05-19 LAB
ESTRADIOL SERPL HS-MCNC: <10 PG/ML
FSH SERPL-ACNC: 88.5 MIU/ML

## 2025-05-19 PROCEDURE — 82670 ASSAY OF TOTAL ESTRADIOL: CPT

## 2025-05-19 PROCEDURE — 83001 ASSAY OF GONADOTROPIN (FSH): CPT

## 2025-05-19 PROCEDURE — 36415 COLL VENOUS BLD VENIPUNCTURE: CPT

## 2025-05-21 ENCOUNTER — PATIENT MESSAGE (OUTPATIENT)
Dept: OBSTETRICS AND GYNECOLOGY | Facility: CLINIC | Age: 60
End: 2025-05-21
Payer: COMMERCIAL

## 2025-05-22 ENCOUNTER — RESULTS FOLLOW-UP (OUTPATIENT)
Dept: OBSTETRICS AND GYNECOLOGY | Facility: CLINIC | Age: 60
End: 2025-05-22

## 2025-05-22 DIAGNOSIS — N95.1 MENOPAUSAL SYMPTOMS: Primary | ICD-10-CM

## 2025-05-22 RX ORDER — ESTRADIOL 0.05 MG/D
1 FILM, EXTENDED RELEASE TRANSDERMAL
Qty: 8 PATCH | Refills: 11 | Status: SHIPPED | OUTPATIENT
Start: 2025-05-22 | End: 2026-05-22

## 2025-07-31 ENCOUNTER — PATIENT MESSAGE (OUTPATIENT)
Dept: OBSTETRICS AND GYNECOLOGY | Facility: CLINIC | Age: 60
End: 2025-07-31
Payer: COMMERCIAL

## 2025-08-26 ENCOUNTER — PATIENT MESSAGE (OUTPATIENT)
Dept: PRIMARY CARE CLINIC | Facility: CLINIC | Age: 60
End: 2025-08-26
Payer: COMMERCIAL

## 2025-08-26 DIAGNOSIS — N60.99 ATYPICAL LOBULAR HYPERPLASIA (ALH) OF BREAST: Primary | ICD-10-CM
